# Patient Record
Sex: MALE | Race: WHITE | Employment: OTHER | URBAN - METROPOLITAN AREA
[De-identification: names, ages, dates, MRNs, and addresses within clinical notes are randomized per-mention and may not be internally consistent; named-entity substitution may affect disease eponyms.]

---

## 2017-11-09 ENCOUNTER — OFFICE VISIT (OUTPATIENT)
Dept: INTERNAL MEDICINE CLINIC | Age: 62
End: 2017-11-09

## 2017-11-09 VITALS
TEMPERATURE: 97.2 F | HEIGHT: 71 IN | RESPIRATION RATE: 14 BRPM | HEART RATE: 72 BPM | OXYGEN SATURATION: 96 % | BODY MASS INDEX: 25.2 KG/M2 | SYSTOLIC BLOOD PRESSURE: 132 MMHG | WEIGHT: 180 LBS | DIASTOLIC BLOOD PRESSURE: 84 MMHG

## 2017-11-09 DIAGNOSIS — Z00.00 ROUTINE GENERAL MEDICAL EXAMINATION AT A HEALTH CARE FACILITY: Primary | ICD-10-CM

## 2017-11-09 DIAGNOSIS — Z23 ENCOUNTER FOR IMMUNIZATION: ICD-10-CM

## 2017-11-09 DIAGNOSIS — H91.93 DECREASED HEARING OF BOTH EARS: ICD-10-CM

## 2017-11-09 RX ORDER — 1.1% SODIUM FLUORIDE PRESCRIPTION DENTAL CREAM 5 MG/G
CREAM DENTAL
Refills: 1 | COMMUNITY
Start: 2017-10-31 | End: 2018-11-20

## 2017-11-09 NOTE — PROGRESS NOTES
Chief Complaint   Patient presents with    Establish Care     Pt is here to establish care    Complete Physical     1. Have you been to the ER, urgent care clinic since your last visit? Hospitalized since your last visit? No    2. Have you seen or consulted any other health care providers outside of the [facility] since your last visit? Include any pap smears or colon screening.  No

## 2017-11-09 NOTE — PATIENT INSTRUCTIONS
are available. There is no live flu virus in flu shots. They cannot cause the flu. There are many flu viruses, and they are always changing. Each year a new flu vaccine is made to protect against three or four viruses that are likely to cause disease in the upcoming flu season. But even when the vaccine doesnt exactly match these viruses, it may still provide some protection    Flu vaccine cannot prevent:   flu that is caused by a virus not covered by the vaccine, or   illnesses that look like flu but are not. It takes about 2 weeks for protection to develop after vaccination, and protection lasts through the flu season. 3. Some people should not get this vaccine    Tell the person who is giving you the vaccine:     If you have any severe, life-threatening allergies. If you ever had a life-threatening allergic reaction after a dose of flu vaccine, or have a severe allergy to any part of this vaccine, you may be advised not to get vaccinated. Most, but not all, types of flu vaccine contain a small amount of egg protein.  If you ever had Guillain-Barré Syndrome (also called GBS). Some people with a history of GBS should not get this vaccine. This should be discussed with your doctor.  If you are not feeling well. It is usually okay to get flu vaccine when you have a mild illness, but you might be asked to come back when you feel better. 4. Risks of a vaccine reaction    With any medicine, including vaccines, there is a chance of reactions. These are usually mild and go away on their own, but serious reactions are also possible. Most people who get a flu shot do not have any problems with it.      Minor problems following a flu shot include:    soreness, redness, or swelling where the shot was given     hoarseness   sore, red or itchy eyes   cough   fever   aches   headache   itching   fatigue  If these problems occur, they usually begin soon after the shot and last 1 or 2 days. More serious problems following a flu shot can include the following:     There may be a small increased risk of Guillain-Barré Syndrome (GBS) after inactivated flu vaccine. This risk has been estimated at 1 or 2 additional cases per million people vaccinated. This is much lower than the risk of severe complications from flu, which can be prevented by flu vaccine.  Young children who get the flu shot along with pneumococcal vaccine (PCV13) and/or DTaP vaccine at the same time might be slightly more likely to have a seizure caused by fever. Ask your doctor for more information. Tell your doctor if a child who is getting flu vaccine has ever had a seizure. Problems that could happen after any injected vaccine:      People sometimes faint after a medical procedure, including vaccination. Sitting or lying down for about 15 minutes can help prevent fainting, and injuries caused by a fall. Tell your doctor if you feel dizzy, or have vision changes or ringing in the ears.  Some people get severe pain in the shoulder and have difficulty moving the arm where a shot was given. This happens very rarely.  Any medication can cause a severe allergic reaction. Such reactions from a vaccine are very rare, estimated at about 1 in a million doses, and would happen within a few minutes to a few hours after the vaccination. As with any medicine, there is a very remote chance of a vaccine causing a serious injury or death. The safety of vaccines is always being monitored. For more information, visit: www.cdc.gov/vaccinesafety/    5. What if there is a serious reaction? What should I look for?  Look for anything that concerns you, such as signs of a severe allergic reaction, very high fever, or unusual behavior.     Signs of a severe allergic reaction can include hives, swelling of the face and throat, difficulty breathing, a fast heartbeat, dizziness, and weakness - usually within a few minutes to a few hours after the vaccination. What should I do?  If you think it is a severe allergic reaction or other emergency that cant wait, call 9-1-1 and get the person to the nearest hospital. Otherwise, call your doctor.  Reactions should be reported to the Vaccine Adverse Event Reporting System (VAERS). Your doctor should file this report, or you can do it yourself through  the VAERS web site at www.vaers. [facility].gov, or by calling 0-186.620.4289. VAERS does not give medical advice. 6. The National Vaccine Injury Compensation Program    The [facility] Vaccine Injury Compensation Program (VICP) is a federal program that was created to compensate people who may have been injured by certain vaccines. Persons who believe they may have been injured by a vaccine can learn about the program and about filing a claim by calling 3-346.111.8389 or visiting the [employer] website at www.[facility].gov/vaccinecompensation. There is a time limit to file a claim for compensation. 7. How can I learn more?  Ask your healthcare provider. He or she can give you the vaccine package insert or suggest other sources of information.  Call your local or state health department.  Contact the Centers for Disease Control and Prevention (CDC):  - Call 1-266.224.8220 (1-800-CDC-INFO) or  - Visit CDCs website at www.cdc.gov/flu    Vaccine Information Statement   Inactivated Influenza Vaccine   8/7/2015  42 GUNJAN Lalo Timothy [id number]    Department of Health and Human Services  Centers for Disease Control and Prevention    Office Use Only

## 2017-11-09 NOTE — PROGRESS NOTES
Establish Care (Pt is here to establish care) and Complete Physical       HPI:  Ct Mondragon is a 60y.o. year old male who is here to establish care. He  had his medical care:    Dr. Clarke Espinosa in [place]. He reports the following history and medical concerns:      /80 at screening. Exercises regularly and diet is good. Tracks food,  Gets 50 miles a week. Son lifts weight with him    Brother x 2-  61 had MI. He changed his lifestyle after that. Used to weight 230 lbs. Had high cholesterol and improved with diet. Had colonoscopy 3 years ago. No polyps. Hearing issue- women's voices. 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 Assessment and Plan        1. Routine general medical examination at a health care facility  Well exam.  Pt exercises and eats healthily. No chest pain or SOB with exertion. Recheck labs   - CBC WITH AUTOMATED DIFF  - LIPID PANEL  - TSH REFLEX TO T4  - METABOLIC PANEL, COMPREHENSIVE  - PSA SCREENING (SCREENING)  - UA/M W/RFLX CULTURE, ROUTINE  - MICROALBUMIN, UR, RAND W/ MICROALBUMIN/CREA RATIO  - CBC WITH AUTOMATED DIFF; Future  - METABOLIC PANEL, COMPREHENSIVE; Future  - LIPID PANEL; Future  - PSA TOTAL (REFLEX TO FREE); Future  - URINALYSIS W/ RFLX MICROSCOPIC; Future  - TSH 3RD GENERATION; Future  - VITAMIN D, 25 HYDROXY; Future    2. Encounter for immunization  Immunization given. Discussed risks and benefits. Side effects. VIS given through visit summary via [employer] or paper copy if not on Mychart   - Influenza virus vaccine (QUADRIVALENT PRES FREE SYRINGE) IM (58959)  - AK IMMUNIZ ADMIN,1 SINGLE/COMB VAC/TOXOID  - diph,Pertuss,Acell,,Tet Vac-PF (ADACEL) 2 Lf-(2.5-5-3-5 mcg)-5Lf/0.5 mL susp; 0.5 mL by IntraMUSCular route once for 1 dose. Dispense: 0.5 mL; Refill: 0    3.  Decreased hearing of both ears  Audiology evaluation referral.  - REFERRAL TO AUDIOLOGY        Visit Vitals    /90 (BP 1 Location: Left arm, BP Patient Position: Sitting)    Pulse 72    Temp 97.2 °F (36.2 °C) (Oral)    Resp 14    Ht 5' 10.5\" (1.791 m)    Wt 180 lb (81.6 kg)    SpO2 96%    BMI 25.46 kg/m2       Historical Data    Past Medical History:   Diagnosis Date    GERD (gastroesophageal reflux disease)     Hypercholesterolemia     Trauma mid 1990's    closed head trauma; no seizures/residua       Past Surgical History:   Procedure Laterality Date    ENDOSCOPY, COLON, DIAGNOSTIC  4/4/2004    Dr. Luis James       Outpatient Encounter Prescriptions as of 11/9/2017   Medication Sig Dispense Refill    SF 5000 PLUS 1.1 % crea   1    multivitamin (ONE A DAY) tablet Take 1 Tab by mouth daily.  ascorbic acid (VITAMIN C) 500 mg tablet Take 500 mg by mouth daily. No facility-administered encounter medications on file as of 11/9/2017. No Known Allergies     Social History     Social History    Marital status:      Spouse name: N/A    Number of children: N/A    Years of education: N/A     Occupational History    Not on file. Social History Main Topics    Smoking status: Former Smoker     Packs/day: 0.10     Years: 1.00     Types: Cigarettes    Smokeless tobacco: Never Used      Comment: stopped 36 yrs ago; just experimented    Alcohol use Yes      Comment: socially    Drug use: No    Sexual activity: Yes     Partners: Female     Birth control/ protection: Surgical     Other Topics Concern    Not on file     Social History Narrative        family history includes Alcohol abuse in his father; Diabetes in his maternal grandmother; Heart Disease in his brother; Hypertension in his brother and brother. Review of Systems   Constitutional: Negative for chills, diaphoresis, fever, malaise/fatigue and weight loss. HENT: Negative for hearing loss. Respiratory: Negative for cough. Cardiovascular: Negative for chest pain. Gastrointestinal: Negative for blood in stool and constipation.    Genitourinary: Negative for dysuria, flank pain, frequency and urgency. Musculoskeletal: Negative for myalgias. Skin: Negative for rash. Neurological: Negative for dizziness, weakness and headaches. Endo/Heme/Allergies: Does not bruise/bleed easily. Physical Exam   Constitutional: He is oriented to person, place, and time. He appears well-nourished. No distress. Neck: Carotid bruit is not present. No thyromegaly present. Cardiovascular: Normal rate, regular rhythm and normal heart sounds. Pulmonary/Chest: Effort normal and breath sounds normal. No respiratory distress. He has no wheezes. Abdominal: Soft. Bowel sounds are normal. He exhibits no mass. There is no tenderness. Hernia confirmed negative in the right inguinal area and confirmed negative in the left inguinal area. Genitourinary: Rectum normal, prostate normal and penis normal. Rectal exam shows guaiac negative stool. Right testis shows no mass, no swelling and no tenderness. Left testis shows no mass, no swelling and no tenderness. No penile tenderness. Musculoskeletal: He exhibits no edema or tenderness. Lymphadenopathy:     He has no cervical adenopathy. No inguinal adenopathy noted on the right or left side. Right: No inguinal adenopathy present. Left: No inguinal adenopathy present. Neurological: He is alert and oriented to person, place, and time. Skin: Skin is warm and dry. No rash noted. No erythema. Psychiatric: He has a normal mood and affect. Thought content normal.   Nursing note and vitals reviewed. Ortho Exam       Orders Placed This Encounter    [place] IMMUNIZ ADMIN,1 SINGLE/COMB VAC/TOXOID    Influenza virus vaccine (QUADRIVALENT PRES FREE SYRINGE) IM ([id number])    SF 5000 PLUS 1.1 % crea     Refill:  1        I have reviewed the patient's medical history in detail and updated the computerized patient record. We had a prolonged discussion about these complex clinical issues and went over the various important aspects to consider.  All questions were answered. Advised him to call back or return to office if symptoms do not improve, change in nature, or persist.    He was given an after visit summary or informed of [employer] Access which includes patient instructions, diagnoses, current medications, & vitals. He expressed understanding with the diagnosis and plan.

## 2017-11-09 NOTE — MR AVS SNAPSHOT
Visit Information Date & Time Provider Department Dept. Phone Encounter #  
 11/9/2017  9:15 AM Julienne Mcmillan MD [place] [id number] [facility] [phone] [id number] Follow-up Instructions Return in about 1 year (around 11/9/2018) for CPE, 30 min slot. Upcoming Health Maintenance Date Due Hepatitis C Screening 1955 DTaP/Tdap/Td series (1 - Tdap) 5/13/1975 Influenza Age 5 to Adult 8/1/2017 COLONOSCOPY 10/15/2025 Allergies as of 11/9/2017  Review Complete On: 11/9/2017 By: Julienne Mcmillan MD  
 No Known Allergies Current Immunizations  Reviewed on 1/31/2011 Name Date Influenza Vaccine (Quad) PF 11/9/2017 Influenza Vaccine Split 11/1/2010 Not reviewed this visit You Were Diagnosed With   
  
 Codes Comments Routine general medical examination at a health care facility    -  Primary ICD-10-CM: Z00.00 ICD-9-CM: V70.0 Encounter for immunization     ICD-10-CM: D64 ICD-9-CM: V03.89 Decreased hearing of both ears     ICD-10-CM: H91.93 
ICD-9-CM: 389. 9 Vitals BP Pulse Temp Resp Height(growth percentile) Weight(growth percentile) 142/90 (BP 1 Location: Left arm, BP Patient Position: Sitting) 72 97.2 °F (36.2 °C) (Oral) 14 5' 10.5\" (1.791 m) 180 lb (81.6 kg) SpO2 BMI Smoking Status 96% 25.46 kg/m2 Never Smoker BMI and BSA Data Body Mass Index Body Surface Area  
 25.46 kg/m 2 2.01 m 2 Preferred Pharmacy Pharmacy Name Phone [phone] [address] [phone] Your Updated Medication List  
  
   
This list is accurate as of: 11/9/17  9:52 AM.  Always use your most recent med list.  
  
  
  
  
 diph,Pertuss(Acell),Tet Vac-PF 2 Lf-(2.5-5-3-5 mcg)-5Lf/0.5 mL susp Commonly known as:  ADACEL  
0.5 mL by IntraMUSCular route once for 1 dose. multivitamin tablet Commonly known as:  ONE A DAY  
 Take 1 Tab by mouth daily. SF 5000 PLUS 1.1 % Crea Generic drug:  fluoride (sodium) VITAMIN C 500 mg tablet Generic drug:  ascorbic acid (vitamin C) Take 500 mg by mouth daily. Prescriptions Printed Refills diph,Pertuss,Acell,,Tet Vac-PF (ADACEL) 2 Lf-(2.5-5-3-5 mcg)-5Lf/0.5 mL susp 0 Si.5 mL by IntraMUSCular route once for 1 dose. Class: Print Route: IntraMUSCular We Performed the Following CBC WITH AUTOMATED DIFF [83210 CPT(R)] INFLUENZA VIRUS VAC QUAD,SPLIT,PRESV FREE SYRINGE IM [id number] CPT(R)] LIPID PANEL [58278 CPT(R)] METABOLIC PANEL, COMPREHENSIVE [62495 CPT(R)] MICROALBUMIN, UR, RAND W/ MICROALBUMIN/CREA RATIO [id number] CPT(R)] [place] IMMUNIZ ADMIN,1 SINGLE/COMB VAC/TOXOID [id number] CPT(R)] PSA SCREENING (SCREENING) [ [facility]] TSH REFLEX TO T4 [[id number] Custom] UA/M W/RFLX CULTURE, ROUTINE [[id number] Custom] Follow-up Instructions Return in about 1 year (around 2018) for CPE, 30 min slot. Patient Instructions Vaccine Information Statement Influenza (Flu) Vaccine (Inactivated or Recombinant): What you need to know Many Vaccine Information Statements are available in Welsh and other languages. See www.immunize.org/vis Hojas de Información Sobre Vacunas están disponibles en Español y en muchos otros idiomas. Visite www.immunize.org/vis 1. Why get vaccinated? Influenza (flu) is a contagious disease that spreads around the [place] every year, usually between October and May. Flu is caused by influenza viruses, and is spread mainly by coughing, sneezing, and close contact. Anyone can get flu. Flu strikes suddenly and can last several days. Symptoms vary by age, but can include: 
 fever/chills  sore throat  muscle aches  fatigue  cough  headache  runny or stuffy nose Flu can also lead to pneumonia and blood infections, and cause diarrhea and seizures in children. If you have a medical condition, such as heart or lung disease, flu can make it worse. Flu is more dangerous for some people. Infants and young children, people 72years of age and older, pregnant women, and people with certain health conditions or a weakened immune system are at greatest risk. Each year thousands of people in the [facility] die from flu, and many more are hospitalized. Flu vaccine can: 
 keep you from getting flu, 
 make flu less severe if you do get it, and 
 keep you from spreading flu to your family and other people. 2. Inactivated and recombinant flu vaccines A dose of flu vaccine is recommended every flu season. Children 6 months through 6years of age may need two doses during the same flu season. Everyone else needs only one dose each flu season. Some inactivated flu vaccines contain a very small amount of a mercury-based preservative called thimerosal. Studies have not shown thimerosal in vaccines to be harmful, but flu vaccines that do not contain thimerosal are available. There is no live flu virus in flu shots. They cannot cause the flu. There are many flu viruses, and they are always changing. Each year a new flu vaccine is made to protect against three or four viruses that are likely to cause disease in the upcoming flu season. But even when the vaccine doesnt exactly match these viruses, it may still provide some protection Flu vaccine cannot prevent: 
 flu that is caused by a virus not covered by the vaccine, or 
 illnesses that look like flu but are not. It takes about 2 weeks for protection to develop after vaccination, and protection lasts through the flu season. 3. Some people should not get this vaccine Tell the person who is giving you the vaccine:  If you have any severe, life-threatening allergies.    
If you ever had a life-threatening allergic reaction after a dose of flu vaccine, or have a severe allergy to any part of this vaccine, you may be advised not to get vaccinated. Most, but not all, types of flu vaccine contain a small amount of egg protein.  If you ever had Guillain-Barré Syndrome (also called GBS). Some people with a history of GBS should not get this vaccine. This should be discussed with your doctor.  If you are not feeling well. It is usually okay to get flu vaccine when you have a mild illness, but you might be asked to come back when you feel better. 4. Risks of a vaccine reaction With any medicine, including vaccines, there is a chance of reactions. These are usually mild and go away on their own, but serious reactions are also possible. Most people who get a flu shot do not have any problems with it. Minor problems following a flu shot include:  
 soreness, redness, or swelling where the shot was given  hoarseness  sore, red or itchy eyes  cough  fever  aches  headache  itching  fatigue If these problems occur, they usually begin soon after the shot and last 1 or 2 days. More serious problems following a flu shot can include the following:  There may be a small increased risk of Guillain-Barré Syndrome (GBS) after inactivated flu vaccine. This risk has been estimated at 1 or 2 additional cases per million people vaccinated. This is much lower than the risk of severe complications from flu, which can be prevented by flu vaccine.  Young children who get the flu shot along with pneumococcal vaccine (PCV13) and/or DTaP vaccine at the same time might be slightly more likely to have a seizure caused by fever. Ask your doctor for more information. Tell your doctor if a child who is getting flu vaccine has ever had a seizure. Problems that could happen after any injected vaccine:  People sometimes faint after a medical procedure, including vaccination. Sitting or lying down for about 15 minutes can help prevent fainting, and injuries caused by a fall. Tell your doctor if you feel dizzy, or have vision changes or ringing in the ears.  Some people get severe pain in the shoulder and have difficulty moving the arm where a shot was given. This happens very rarely.  Any medication can cause a severe allergic reaction. Such reactions from a vaccine are very rare, estimated at about 1 in a million doses, and would happen within a few minutes to a few hours after the vaccination. As with any medicine, there is a very remote chance of a vaccine causing a serious injury or death. The safety of vaccines is always being monitored. For more information, visit: www.cdc.gov/vaccinesafety/ 
 
 
The [facility] Vaccine Injury Compensation Program (VICP) is a federal program that was created to compensate people who may have been injured by certain vaccines.  
 
Persons who believe they may have been injured by a vaccine can learn about the program and about filing a claim by calling 9-498.997.3269 or visiting the [employer] website at [facility].gov/vaccinecompensation. There is a time limit to file a claim for compensation. 7. How can I learn more?  Ask your healthcare provider. He or she can give you the vaccine package insert or suggest other sources of information.  Call your local or state health department.  Contact the Centers for Disease Control and Prevention (CDC): 
- Call 6-444.767.9231 (1-800-CDC-INFO) or 
- Visit CDCs website at www.cdc.gov/flu Vaccine Information Statement Inactivated Influenza Vaccine 8/7/2015 
42 GUNJAN Lynn [id number] [facility] [place] Centers for Disease Control and Prevention Office Use Only Introducing [facility]! [facility] introduces [employer] patient portal. Now you can access parts of your medical record, email your doctor's office, and request medication refills online. 1. In your internet browser, go to [email or web address] 2. Click on the First Time User? Click Here link in the Sign In box. You will see the New Member Sign Up page. 3. Enter your [employer] Access Code exactly as it appears below. You will not need to use this code after youve completed the sign-up process. If you do not sign up before the expiration date, you must request a new code. · [employer] Access Code: [email or web address] Expires: 2/7/2018  9:21 AM 
 
4. Enter the last four digits of your Social Security Number (xxxx) and Date of Birth (mm/dd/yyyy) as indicated and click Submit. You will be taken to the next sign-up page. 5. Create a [employer] ID. This will be your [employer] login ID and cannot be changed, so think of one that is secure and easy to remember. 6. Create a [employer] password. You can change your password at any time. 7. Enter your Password Reset Question and Answer. This can be used at a later time if you forget your password. 8. Enter your e-mail address. You will receive e-mail notification when new information is available in [address] 9. Click Sign Up. You can now view and download portions of your medical record. 10. Click the Download Summary menu link to download a portable copy of your medical information. If you have questions, please visit the Frequently Asked Questions section of the [employer] website. Remember, [employer] is NOT to be used for urgent needs. For medical emergencies, dial 911. Now available from your iPhone and Android! Please provide this summary of care documentation to your next provider. Your primary care clinician is listed as [facility]. If you have any questions after today's visit, please call [phone].

## 2017-11-11 LAB
ALBUMIN SERPL-MCNC: 4.6 G/DL (ref 3.6–4.8)
ALBUMIN/CREAT UR: 2.7 MG/G CREAT (ref 0–30)
ALBUMIN/GLOB SERPL: 2.1 {RATIO} (ref 1.2–2.2)
ALP SERPL-CCNC: 49 IU/L (ref 39–117)
ALT SERPL-CCNC: 15 IU/L (ref 0–44)
APPEARANCE UR: CLEAR
AST SERPL-CCNC: 18 IU/L (ref 0–40)
BACTERIA #/AREA URNS HPF: NORMAL /[HPF]
BASOPHILS # BLD AUTO: 0.1 X10E3/UL (ref 0–0.2)
BASOPHILS NFR BLD AUTO: 1 %
BILIRUB SERPL-MCNC: 0.5 MG/DL (ref 0–1.2)
BILIRUB UR QL STRIP: NEGATIVE
BUN SERPL-MCNC: 18 MG/DL (ref 8–27)
BUN/CREAT SERPL: 17 (ref 10–24)
CALCIUM SERPL-MCNC: 9 MG/DL (ref 8.6–10.2)
CASTS URNS QL MICRO: NORMAL /LPF
CHLORIDE SERPL-SCNC: 102 MMOL/L (ref 96–106)
CHOLEST SERPL-MCNC: 193 MG/DL (ref 100–199)
CO2 SERPL-SCNC: 26 MMOL/L (ref 18–29)
COLOR UR: YELLOW
CREAT SERPL-MCNC: 1.04 MG/DL (ref 0.76–1.27)
CREAT UR-MCNC: 115.1 MG/DL
EOSINOPHIL # BLD AUTO: 0.3 X10E3/UL (ref 0–0.4)
EOSINOPHIL NFR BLD AUTO: 4 %
EPI CELLS #/AREA URNS HPF: NORMAL /HPF
ERYTHROCYTE [DISTWIDTH] IN BLOOD BY AUTOMATED COUNT: 13.1 % (ref 12.3–15.4)
GFR SERPLBLD CREATININE-BSD FMLA CKD-EPI: 76 ML/MIN/1.73
GFR SERPLBLD CREATININE-BSD FMLA CKD-EPI: 88 ML/MIN/1.73
GLOBULIN SER CALC-MCNC: 2.2 G/DL (ref 1.5–4.5)
GLUCOSE SERPL-MCNC: 87 MG/DL (ref 65–99)
GLUCOSE UR QL: NEGATIVE
HCT VFR BLD AUTO: 46.9 % (ref 37.5–51)
HDLC SERPL-MCNC: 63 MG/DL
HGB BLD-MCNC: 16 G/DL (ref 12.6–17.7)
HGB UR QL STRIP: NEGATIVE
IMM GRANULOCYTES # BLD: 0 X10E3/UL (ref 0–0.1)
IMM GRANULOCYTES NFR BLD: 0 %
INTERPRETATION, 910389: NORMAL
KETONES UR QL STRIP: NEGATIVE
LDLC SERPL CALC-MCNC: 112 MG/DL (ref 0–99)
LEUKOCYTE ESTERASE UR QL STRIP: NEGATIVE
LYMPHOCYTES # BLD AUTO: 2.5 X10E3/UL (ref 0.7–3.1)
LYMPHOCYTES NFR BLD AUTO: 33 %
MCH RBC QN AUTO: 29.7 PG (ref 26.6–33)
MCHC RBC AUTO-ENTMCNC: 34.1 G/DL (ref 31.5–35.7)
MCV RBC AUTO: 87 FL (ref 79–97)
MICRO URNS: NORMAL
MICRO URNS: NORMAL
MICROALBUMIN UR-MCNC: 3.1 UG/ML
MONOCYTES # BLD AUTO: 0.7 X10E3/UL (ref 0.1–0.9)
MONOCYTES NFR BLD AUTO: 9 %
MUCOUS THREADS URNS QL MICRO: PRESENT
NEUTROPHILS # BLD AUTO: 4.1 X10E3/UL (ref 1.4–7)
NEUTROPHILS NFR BLD AUTO: 53 %
NITRITE UR QL STRIP: NEGATIVE
PH UR STRIP: 6 [PH] (ref 5–7.5)
PLATELET # BLD AUTO: 258 X10E3/UL (ref 150–379)
POTASSIUM SERPL-SCNC: 4.4 MMOL/L (ref 3.5–5.2)
PROT SERPL-MCNC: 6.8 G/DL (ref 6–8.5)
PROT UR QL STRIP: NEGATIVE
PSA SERPL-MCNC: 0.6 NG/ML (ref 0–4)
RBC # BLD AUTO: 5.38 X10E6/UL (ref 4.14–5.8)
RBC #/AREA URNS HPF: NORMAL /HPF
SODIUM SERPL-SCNC: 142 MMOL/L (ref 134–144)
SP GR UR: 1.02 (ref 1–1.03)
TRIGL SERPL-MCNC: 90 MG/DL (ref 0–149)
TSH SERPL DL<=0.005 MIU/L-ACNC: 2.27 UIU/ML (ref 0.45–4.5)
URINALYSIS REFLEX, 377202: NORMAL
UROBILINOGEN UR STRIP-MCNC: 0.2 MG/DL (ref 0.2–1)
VLDLC SERPL CALC-MCNC: 18 MG/DL (ref 5–40)
WBC # BLD AUTO: 7.7 X10E3/UL (ref 3.4–10.8)
WBC #/AREA URNS HPF: NORMAL /HPF

## 2018-11-20 ENCOUNTER — OFFICE VISIT (OUTPATIENT)
Dept: INTERNAL MEDICINE CLINIC | Age: 63
End: 2018-11-20

## 2018-11-20 ENCOUNTER — TELEPHONE (OUTPATIENT)
Dept: INTERNAL MEDICINE CLINIC | Age: 63
End: 2018-11-20

## 2018-11-20 VITALS
RESPIRATION RATE: 18 BRPM | BODY MASS INDEX: 25.73 KG/M2 | WEIGHT: 190 LBS | HEIGHT: 72 IN | OXYGEN SATURATION: 96 % | HEART RATE: 66 BPM | DIASTOLIC BLOOD PRESSURE: 80 MMHG | TEMPERATURE: 97.1 F | SYSTOLIC BLOOD PRESSURE: 136 MMHG

## 2018-11-20 DIAGNOSIS — Z00.00 ROUTINE GENERAL MEDICAL EXAMINATION AT A HEALTH CARE FACILITY: Primary | ICD-10-CM

## 2018-11-20 DIAGNOSIS — E78.00 PURE HYPERCHOLESTEROLEMIA: ICD-10-CM

## 2018-11-20 NOTE — PATIENT INSTRUCTIONS
Vitamin D3 2000 International units once a day Appointment on 11/15/2018 Component Date Value Ref Range Status  VITAMIN D, 25-HYDROXY 11/15/2018 16.3* 30.0 - 100.0 ng/mL Final  
 Comment: Vitamin D deficiency has been defined by the [address] [facility] practice guideline as a 
level of serum 25-OH vitamin D less than 20 ng/mL (1,2). The Endocrine Society went on to further define vitamin D 
insufficiency as a level between 21 and 29 ng/mL (2). 1. IOM ([place] of [employer]). 2010. Dietary reference 
   intakes for calcium and D. [facility]: The 
   [place]. 2. Sunitha MF, Agustina NC, Zeny PRADHAN, et al. 
   Evaluation, treatment, and prevention of vitamin D 
   deficiency: an Endocrine Society clinical practice 
   guideline. JCEM. 2011 Jul; 96(7):1911-30.  TSH 11/15/2018 1.360  0.450 - 4.500 uIU/mL Final  
 Specific Gravity 11/15/2018 1.023  1.005 - 1.030 Final  
 pH (UA) 11/15/2018 5.5  5.0 - 7.5 Final  
 Color 11/15/2018 Yellow  Yellow Final  
 Appearance 11/15/2018 Clear  Clear Final  
 Leukocyte Esterase 11/15/2018 Negative  Negative Final  
 Protein 11/15/2018 Negative  Negative/Trace Final  
 Glucose 11/15/2018 Negative  Negative Final  
 Ketone 11/15/2018 Negative  Negative Final  
 Blood 11/15/2018 Negative  Negative Final  
 Bilirubin 11/15/2018 Negative  Negative Final  
 Urobilinogen 11/15/2018 0.2  0.2 - 1.0 mg/dL Final  
 Nitrites 11/15/2018 Negative  Negative Final  
 Microscopic Examination 11/15/2018 Comment   Final  
 Microscopic not indicated and not performed.  Prostate Specific Ag 11/15/2018 0.8  0.0 - 4.0 ng/mL Final  
 Comment: Roche ECLIA methodology. According to the American Urological Association, Serum PSA should 
decrease and remain at undetectable levels after radical 
prostatectomy.  The AUA defines biochemical recurrence as an initial 
 PSA value 0.2 ng/mL or greater followed by a subsequent confirmatory PSA value 0.2 ng/mL or greater. Values obtained with different assay methods or kits cannot be used 
interchangeably. Results cannot be interpreted as absolute evidence 
of the presence or absence of malignant disease.  Reflex Criteria 11/15/2018 Comment   Final  
 Comment: The percent free PSA is performed on a reflex basis only when the 
total PSA is between 4.0 and 10.0 ng/mL.  Cholesterol, total 11/15/2018 223* 100 - 199 mg/dL Final  
 Triglyceride 11/15/2018 119  0 - 149 mg/dL Final  
 HDL Cholesterol 11/15/2018 67  >39 mg/dL Final  
 VLDL, calculated 11/15/2018 24  5 - 40 mg/dL Final  
 LDL, calculated 11/15/2018 132* 0 - 99 mg/dL Final  
 Glucose 11/15/2018 94  65 - 99 mg/dL Final  
 BUN 11/15/2018 20  8 - 27 mg/dL Final  
 Creatinine 11/15/2018 1.13  0.76 - 1.27 mg/dL Final  
 GFR est non-AA 11/15/2018 68  >59 mL/min/1.73 Final  
 GFR est AA 11/15/2018 79  >59 mL/min/1.73 Final  
 BUN/Creatinine ratio 11/15/2018 18  10 - 24 Final  
 Sodium 11/15/2018 143  134 - 144 mmol/L Final  
 Potassium 11/15/2018 4.4  3.5 - 5.2 mmol/L Final  
 Chloride 11/15/2018 103  96 - 106 mmol/L Final  
 CO2 11/15/2018 21  20 - 29 mmol/L Final  
 Calcium 11/15/2018 9.0  8.6 - 10.2 mg/dL Final  
 Protein, total 11/15/2018 6.9  6.0 - 8.5 g/dL Final  
 Albumin 11/15/2018 4.6  3.6 - 4.8 g/dL Final  
 GLOBULIN, TOTAL 11/15/2018 2.3  1.5 - 4.5 g/dL Final  
 A-G Ratio 11/15/2018 2.0  1.2 - 2.2 Final  
 Bilirubin, total 11/15/2018 0.5  0.0 - 1.2 mg/dL Final  
 Alk.  phosphatase 11/15/2018 55  39 - 117 IU/L Final  
 AST (SGOT) 11/15/2018 20  0 - 40 IU/L Final  
 ALT (SGPT) 11/15/2018 19  0 - 44 IU/L Final  
 WBC 11/15/2018 6.5  3.4 - 10.8 x10E3/uL Final  
 RBC 11/15/2018 5.12  4.14 - 5.80 x10E6/uL Final  
 HGB 11/15/2018 15.0  13.0 - 17.7 g/dL Final  
 HCT 11/15/2018 45.4  37.5 - 51.0 % Final  
 MCV 11/15/2018 89  79 - 97 fL Final  
  MCH 11/15/2018 29.3  26.6 - 33.0 pg Final  
 MCHC 11/15/2018 33.0  31.5 - 35.7 g/dL Final  
 RDW 11/15/2018 14.8  12.3 - 15.4 % Final  
 PLATELET 68/14/1977 591  150 - 379 x10E3/uL Final  
 NEUTROPHILS 11/15/2018 64  Not Estab. % Final  
 Lymphocytes 11/15/2018 24  Not Estab. % Final  
 MONOCYTES 11/15/2018 7  Not Estab. % Final  
 EOSINOPHILS 11/15/2018 4  Not Estab. % Final  
 BASOPHILS 11/15/2018 1  Not Estab. % Final  
 ABS. NEUTROPHILS 11/15/2018 4.2  1.4 - 7.0 x10E3/uL Final  
 Abs Lymphocytes 11/15/2018 1.5  0.7 - 3.1 x10E3/uL Final  
 ABS. MONOCYTES 11/15/2018 0.5  0.1 - 0.9 x10E3/uL Final  
 ABS. EOSINOPHILS 11/15/2018 0.2  0.0 - 0.4 x10E3/uL Final  
 ABS. BASOPHILS 11/15/2018 0.0  0.0 - 0.2 x10E3/uL Final  
 IMMATURE GRANULOCYTES 11/15/2018 0  Not Estab. % Final  
 ABS. IMM. GRANS. 11/15/2018 0.0  0.0 - 0.1 x10E3/uL Final  
 INTERPRETATION 11/15/2018 Note   Final  
 Supplemental report is available.

## 2018-11-20 NOTE — PROGRESS NOTES
HPI: 
Thea De La Rosa is a 58y.o. year old male who is here for an annual physical: 
LAST SEEN: 11/15/2018 Wt Readings from Last 3 Encounters:  
11/20/18 190 lb (86.2 kg) 11/09/17 180 lb (81.6 kg) 01/24/12 180 lb (81.6 kg) Temp Readings from Last 3 Encounters:  
11/20/18 97.1 °F (36.2 °C) (Oral) 11/09/17 97.2 °F (36.2 °C) (Oral) 01/24/12 98.7 °F (37.1 °C) BP Readings from Last 3 Encounters:  
11/20/18 142/90  
11/09/17 132/84  
01/24/12 137/76 Pulse Readings from Last 3 Encounters:  
11/20/18 66  
11/09/17 72  
01/24/12 81 He reports the following history and medical concerns: Body mass index is 25.77 kg/m². Lab Results Component Value Date/Time Cholesterol, total 223 (H) 11/15/2018 08:05 AM  
 HDL Cholesterol 67 11/15/2018 08:05 AM  
 LDL, calculated 132 (H) 11/15/2018 08:05 AM  
 VLDL, calculated 24 11/15/2018 08:05 AM  
 Triglyceride 119 11/15/2018 08:05 AM  
  
Calorie count, exercised 5 miles a day. Swims. Lift weights. Had colonoscopy 5 years - never had polyps. . Assessment and Plan 1. Routine general medical examination at a health care facility Pt is very active. Exercises daily. Had colonoscopy 5 years ago as per pt. 2. Pure hypercholesterolemia Slight increase but overall good with HDL good. Discussed risks benefits of baby aspirin 81 mg. Pt will not need now. Historical Data Vitals:  
 11/20/18 0815 BP: 142/90 Pulse: 66 Resp: 18 Temp: 97.1 °F (36.2 °C) TempSrc: Oral  
SpO2: 96% Weight: 190 lb (86.2 kg) Height: 6' (1.829 m) Past Medical History:  
Diagnosis Date  GERD (gastroesophageal reflux disease)  Hypercholesterolemia  Trauma mid 1990's  
 closed head trauma; no seizures/residua Past Surgical History:  
Procedure Laterality Date  ENDOSCOPY, COLON, DIAGNOSTIC  4/4/2004 Dr. Radha Randle Outpatient Encounter Medications as of 11/20/2018 Medication Sig Dispense Refill  SF 5000 PLUS 1.1 % crea   1  
 multivitamin (ONE A DAY) tablet Take 1 Tab by mouth daily.  ascorbic acid (VITAMIN C) 500 mg tablet Take 500 mg by mouth daily. No facility-administered encounter medications on file as of 11/20/2018. No Known Allergies Social History Socioeconomic History  Marital status:  Spouse name: Not on file  Number of children: Not on file  Years of education: Not on file  Highest education level: Not on file Social Needs  Financial resource strain: Not on file  Food insecurity - worry: Not on file  Food insecurity - inability: Not on file  Transportation needs - medical: Not on file  Transportation needs - non-medical: Not on file Occupational History  Occupation: . Tobacco Use  Smoking status: Never Smoker  Smokeless tobacco: Never Used Substance and Sexual Activity  Alcohol use: Yes Comment: socially- couple of glasses each night  Drug use: No  
 Sexual activity: Yes  
  Partners: Female Birth control/protection: Surgical  
Other Topics Concern  Not on file Social History Narrative  Not on file  
  
 
family history includes Alcohol abuse in his father; Heart Attack (age of onset: 61) in his brother; Heart Disease (age of onset: 61) in his brother; Hypertension in his brother and brother. Review of Systems Constitutional: Negative for chills, diaphoresis, fever, malaise/fatigue and weight loss. HENT: Negative for hearing loss. Respiratory: Negative for cough. Cardiovascular: Negative for chest pain. Gastrointestinal: Negative for blood in stool and constipation. Genitourinary: Negative for dysuria, flank pain, frequency and urgency. Musculoskeletal: Negative for myalgias. Skin: Negative for rash. Neurological: Negative for dizziness, weakness and headaches. Endo/Heme/Allergies: Does not bruise/bleed easily. Visit Vitals /90 (BP 1 Location: Left arm, BP Patient Position: Sitting) Pulse 66 Temp 97.1 °F (36.2 °C) (Oral) Resp 18 Ht 6' (1.829 m) Wt 190 lb (86.2 kg) SpO2 96% BMI 25.77 kg/m² Physical Exam  
Constitutional: He is oriented to person, place, and time and well-developed, well-nourished, and in no distress. No distress. HENT:  
Nose: Nose normal.  
Mouth/Throat: Oropharynx is clear and moist. No oropharyngeal exudate. Eyes: Conjunctivae and EOM are normal. Left eye exhibits no discharge. Neck: Normal range of motion. Neck supple. No thyromegaly present. Cardiovascular: Normal rate, regular rhythm and normal heart sounds. Exam reveals no friction rub. No murmur heard. Pulmonary/Chest: Effort normal and breath sounds normal. No respiratory distress. He has no wheezes. He has no rales. Abdominal: Soft. Bowel sounds are normal. He exhibits no distension. There is no tenderness. Genitourinary: Rectum normal, prostate normal, testes/scrotum normal and penis normal. Rectal exam shows guaiac negative stool. He exhibits no abnormal testicular mass, no testicular tenderness, no abnormal scrotal mass, no scrotal tenderness and no epididymal tenderness. Penis exhibits no lesions and no edema. Musculoskeletal: Normal range of motion. He exhibits no edema, tenderness or deformity. Lymphadenopathy:  
  He has no cervical adenopathy. Right: No inguinal adenopathy present. Left: No inguinal adenopathy present. Neurological: He is alert and oriented to person, place, and time. He exhibits normal muscle tone. Coordination normal.  
Skin: Skin is warm and dry. No rash noted. No erythema. Psychiatric: Mood and affect normal.  
 
Ortho Exam  
 
Assessment:  See Above for discussion/plan. Annual Physical completed.  
 
Counseling and risk factor reduction topics were discussed such as continue with exercise regularly. Walk at fast pace. Watch fatty foods and cheese. I have reviewed the patient's medical history in detail and updated the computerized patient record. We had a prolonged discussion about these complex clinical issues and went over the various important aspects to consider. All questions were answered. Advised him to call back or return to office if symptoms do not improve, change in nature, or persist. 
 
He was given an after visit summary or informed of [facility] Access which includes patient instructions, diagnoses, current medications, & vitals. He expressed understanding with the diagnosis and plan.

## 2018-11-20 NOTE — PROGRESS NOTES
Reviewed record in preparation for visit and have obtained necessary documentation. Identified pt with two pt identifiers(name and ). Health Maintenance Due Topic  Hepatitis C Screening  DTaP/Tdap/Td series (1 - Tdap)  Shingrix Vaccine Age 50> (1 of 2)  Influenza Age 5 to Adult Chief Complaint Patient presents with  Complete Physical  
  
 
Wt Readings from Last 3 Encounters:  
18 190 lb (86.2 kg) 17 180 lb (81.6 kg) 12 180 lb (81.6 kg) Temp Readings from Last 3 Encounters:  
17 97.2 °F (36.2 °C) (Oral) 12 98.7 °F (37.1 °C) BP Readings from Last 3 Encounters:  
17 132/84  
12 137/76  
11 150/89 Pulse Readings from Last 3 Encounters:  
17 72  
12 81  
11 79 Learning Assessment: 
:  
 
Learning Assessment 2018 PRIMARY LEARNER Patient HIGHEST LEVEL OF EDUCATION - PRIMARY LEARNER  GRADUATED HIGH SCHOOL OR GED  
BARRIERS PRIMARY LEARNER NONE PRIMARY LANGUAGE ENGLISH  
LEARNER PREFERENCE PRIMARY DEMONSTRATION  
ANSWERED BY patient RELATIONSHIP SELF Depression Screening: 
:  
 
PHQ over the last two weeks 2018 Little interest or pleasure in doing things Not at all Feeling down, depressed, irritable, or hopeless Not at all Total Score PHQ 2 0 Fall Risk Assessment: 
:  
 
Fall Risk Assessment, last 12 mths 2018 Able to walk? Yes Fall in past 12 months? No  
 
 
Abuse Screening: 
:  
 
Abuse Screening Questionnaire 2018 Do you ever feel afraid of your partner? Jeri Zamora Are you in a relationship with someone who physically or mentally threatens you? Jeri Zamora Is it safe for you to go home? [place] Coordination of Care Questionnaire: 
:  
 
1) Have you been to an emergency room, urgent care clinic since your last visit? no  
Hospitalized since your last visit? no          
 
2) Have you seen or consulted any other health care providers outside of [facility] since your last visit? yes   Dr. Gipson Back 2017(Include any pap smears or colon screenings in this section.) 3) Do you have an Advance Directive on file? no 
 
4) Are you interested in receiving information on Advance Directives? YES Patient is accompanied by self I have received verbal consent from Nahomi Camara to discuss any/all medical information while they are present in the room.

## 2018-11-20 NOTE — TELEPHONE ENCOUNTER
----- Message from Alex Garza sent at 11/20/2018  1:35 PM EST -----  Regarding: [place]/telephone  Pts wife Iris Ayala is requesting her husbands physical form to be faxed to his insurance. Wifes number is [phone]

## 2019-07-17 ENCOUNTER — OFFICE VISIT (OUTPATIENT)
Dept: PRIMARY CARE CLINIC | Age: 64
End: 2019-07-17

## 2019-07-17 VITALS
BODY MASS INDEX: 26.54 KG/M2 | DIASTOLIC BLOOD PRESSURE: 92 MMHG | HEART RATE: 66 BPM | TEMPERATURE: 98.7 F | HEIGHT: 71 IN | SYSTOLIC BLOOD PRESSURE: 152 MMHG | OXYGEN SATURATION: 98 % | WEIGHT: 189.6 LBS | RESPIRATION RATE: 16 BRPM

## 2019-07-17 DIAGNOSIS — H91.93 BILATERAL HEARING LOSS, UNSPECIFIED HEARING LOSS TYPE: ICD-10-CM

## 2019-07-17 DIAGNOSIS — R03.0 WHITE COAT SYNDROME WITHOUT DIAGNOSIS OF HYPERTENSION: Primary | ICD-10-CM

## 2019-07-17 NOTE — PROGRESS NOTES
Chief Complaint   Patient presents with   [facility]     and patient needs referral for hearring aids     1. Have you been to an emergency room, urgent clinic, or hospitalized since your last visit? NO  If yes, where when, and reason for visit? 2. Have seen or consulted any other health care provider since your last visit? NO  Please include any pap smears or colon screening in this section  If yes, where when, and reason for visit? 3. Have you had any blood work or xray, including a mammogram since your last visit NO  If yes, where when, and reason for visit? 4. Are there any changes in medications including immunizations since your last visit? NO  If yes, where when, and reason for visit? 5. Would you like to speak with a health care team member about safety in your home? NO    6. Do you have an Advanced Directive/ Living Will in place?  NO  If yes, do we have a copy on file NO  If no, would you like information NO

## 2019-07-17 NOTE — PROGRESS NOTES
Written by Sadie Hood, as dictated by Dr. Hemalatha Molina MD.    Jerad Bess is a 71 y.o. male. HPI  The patient comes in today to establish care. He was previously under the care of Dr. Jimena Marcelino (). Records in [facility] reviewed. Patient requests a referral to Dr. Ron garcia (audiology) as he needs hearing aids. He had a hearing test 2 years ago and was told he needed hearing aids, but pt refused at that time. Patient has an appointment scheduled for 08/09/2019. BP is high today at 164/88, 152/92 on repeat. He attributes his elevated BP to White Coat Syndrome and stress getting to his appointment. Patient notes his BP is normally elevated when he goes to the doctor. He is not concerned about his BP as he does high intensity interval training 6 days per week and wears a heart monitor. The pt refuses to monitor his BP at home. Patient Active Problem List   Diagnosis Code   (none) - all problems resolved or deleted        Current Outpatient Medications on File Prior to Visit   Medication Sig Dispense Refill    ergocalciferol, vitamin D2, (VITAMIN D2 PO) Take  by mouth. No current facility-administered medications on file prior to visit.         Past Medical History:   Diagnosis Date    GERD (gastroesophageal reflux disease)     Hypercholesterolemia     Trauma mid 1990's    closed head trauma; no seizures/residua       Past Surgical History:   Procedure Laterality Date    ENDOSCOPY, COLON, DIAGNOSTIC  4/4/2004    Dr. Nessa Starr       Family History   Problem Relation Age of Onset    Alcohol abuse Father     Hypertension Brother     Heart Disease Brother 61        CABG @ 62    Hypertension Brother     Heart Attack Brother 61       Social History     Socioeconomic History    Marital status:      Spouse name: Not on file    Number of children: Not on file    Years of education: Not on file    Highest education level: Not on file   Occupational History    Occupation: . Social Needs    Financial resource strain: Not on file    Food insecurity:     Worry: Not on file     Inability: Not on file    Transportation needs:     Medical: Not on file     Non-medical: Not on file   Tobacco Use    Smoking status: Never Smoker    Smokeless tobacco: Never Used   Substance and Sexual Activity    Alcohol use: Yes     Comment: socially- couple of glasses each night    Drug use: No    Sexual activity: Yes     Partners: Female     Birth control/protection: Surgical   Lifestyle    Physical activity:     Days per week: Not on file     Minutes per session: Not on file    Stress: Not on file   Relationships    Social connections:     Talks on phone: Not on file     Gets together: Not on file     Attends Islam service: Not on file     Active member of club or organization: Not on file     Attends meetings of clubs or organizations: Not on file     Relationship status: Not on file    Intimate partner violence:     Fear of current or ex partner: Not on file     Emotionally abused: Not on file     Physically abused: Not on file     Forced sexual activity: Not on file   Other Topics Concern    Not on file   Social History Narrative    Not on file       Review of Systems   Constitutional: Negative for malaise/fatigue. HENT: Positive for hearing loss. Negative for congestion. Eyes: Negative for blurred vision and pain. Respiratory: Negative for cough and shortness of breath. Cardiovascular: Negative for chest pain and palpitations. Gastrointestinal: Negative for abdominal pain and heartburn. Genitourinary: Negative for frequency and urgency. Musculoskeletal: Negative for joint pain and myalgias. Neurological: Negative for dizziness, tingling, sensory change, weakness and headaches. Psychiatric/Behavioral: Negative for depression, memory loss and substance abuse.      Visit Vitals  BP (!) 152/92 (BP 1 Location: Left arm, BP Patient Position: Sitting)   Pulse 66   Temp 98.7 °F (37.1 °C) (Oral)   Resp 16   Ht 5' 11\" (1.803 m)   Wt 189 lb 9.6 oz (86 kg)   SpO2 98%   BMI 26.44 kg/m²       Physical Exam   Constitutional: He is oriented to person, place, and time. He appears well-developed and well-nourished. No distress. HENT:   Right Ear: External ear normal.   Left Ear: External ear normal.   Eyes: Conjunctivae and EOM are normal. Right eye exhibits no discharge. Left eye exhibits no discharge. Neck: Normal range of motion. Neck supple. Cardiovascular: Normal rate, regular rhythm and normal heart sounds. Pulmonary/Chest: Effort normal and breath sounds normal. He has no wheezes. Abdominal: Soft. Bowel sounds are normal. There is no tenderness. Lymphadenopathy:     He has no cervical adenopathy. Neurological: He is alert and oriented to person, place, and time. Skin: He is not diaphoretic. Psychiatric: He has a normal mood and affect. His behavior is normal.   Nursing note and vitals reviewed. ASSESSMENT and PLAN    ICD-10-CM ICD-9-CM    1. White coat syndrome without diagnosis of hypertension R03.0 796.2 Recommended monitoring BP at home, but pt refused. 2. Bilateral hearing loss, unspecified hearing loss type H91.93 389.9 REFERRAL TO AUDIOLOGY    Referred to audiology. Pt has an appointment on 08/09/2019. This plan was reviewed with the patient and patient agrees. All questions were answered. This scribe documentation was reviewed by me and accurately reflects the examination and decisions made by me. This note will not be viewable in [address]

## 2019-10-24 ENCOUNTER — OFFICE VISIT (OUTPATIENT)
Dept: PRIMARY CARE CLINIC | Age: 64
End: 2019-10-24

## 2019-10-24 VITALS
OXYGEN SATURATION: 99 % | RESPIRATION RATE: 16 BRPM | DIASTOLIC BLOOD PRESSURE: 89 MMHG | TEMPERATURE: 97.8 F | SYSTOLIC BLOOD PRESSURE: 143 MMHG | HEIGHT: 71 IN | HEART RATE: 68 BPM | BODY MASS INDEX: 25.9 KG/M2 | WEIGHT: 185 LBS

## 2019-10-24 DIAGNOSIS — Z00.00 PHYSICAL EXAM: Primary | ICD-10-CM

## 2019-10-24 DIAGNOSIS — E55.9 VITAMIN D DEFICIENCY: ICD-10-CM

## 2019-10-24 DIAGNOSIS — E78.2 MIXED HYPERLIPIDEMIA: ICD-10-CM

## 2019-10-24 DIAGNOSIS — Z11.59 NEED FOR HEPATITIS C SCREENING TEST: ICD-10-CM

## 2019-10-24 DIAGNOSIS — R59.0 ANTERIOR CERVICAL LYMPHADENOPATHY: ICD-10-CM

## 2019-10-24 NOTE — PROGRESS NOTES
1. Have you been to the ER, urgent care clinic since your last visit? Hospitalized since your last visit? No    2. Have you seen or consulted any other health care providers outside of the [address] [place] [address] since your last visit? Include any pap smears or colon screening.  No     Chief Complaint   Patient presents with    Annual Exam       Visit Vitals  /89 (BP 1 Location: Left arm, BP Patient Position: Sitting)   Pulse 68   Temp 97.8 °F (36.6 °C) (Oral)   Resp 16   Ht 5' 11\" (1.803 m)   Wt 185 lb (83.9 kg)   SpO2 99%   BMI 25.80 kg/m²       Health Maintenance Due   Topic Date Due    Hepatitis C Screening  1955    DTaP/Tdap/Td series (1 - Tdap) 11/08/2018    Shingrix Vaccine Age 50> (2 of 2) 01/02/2019    GLAUCOMA SCREENING Q2Y  05/13/2019    Pneumococcal 65+ years (1 of 2 - PCV13) 05/13/2019    Influenza Age 9 to Adult  08/01/2019

## 2019-10-24 NOTE — PROGRESS NOTES
Written by Yee Markham, as dictated by Dr. Juan Jose Pratt MD.    Lamont Jhaveri is a 71 y.o. male. HPI   The patient presents today for his annual physical. He does not have any complaints today. He does not reports any issues with seasonal allergies. He states that he sleeps well, per his Fitbit logs, and his wife does not complain of any snoring. He denies any headaches, constipation, URI symptoms, difficulty urinating, frequency, and dysuria. He reports that he does not have any issues with his cholesterol since he is \"very healthy\". Per his food log, he states that he does not eat pasta or bread, and he goes to intense interval training classes multiple times per week at [facility]. Patient Active Problem List   Diagnosis Code   (none) - all problems resolved or deleted        Current Outpatient Medications on File Prior to Visit   Medication Sig Dispense Refill    ergocalciferol, vitamin D2, (VITAMIN D2 PO) Take  by mouth two (2) times a day. No current facility-administered medications on file prior to visit. No Known Allergies    Past Medical History:   Diagnosis Date    GERD (gastroesophageal reflux disease)     Hypercholesterolemia     Trauma mid 1990's    closed head trauma; no seizures/residua       Past Surgical History:   Procedure Laterality Date    ENDOSCOPY, COLON, DIAGNOSTIC  4/4/2004    Dr. Cristian William       Family History   Problem Relation Age of Onset    Alcohol abuse Father     Hypertension Brother     Heart Disease Brother 61        CABG @ 62    Hypertension Brother     Heart Attack Brother 61       Social History     Socioeconomic History    Marital status:      Spouse name: Not on file    Number of children: Not on file    Years of education: Not on file    Highest education level: Not on file   Occupational History    Occupation: .    Social Needs    Financial resource strain: Not on file    Food insecurity:     Worry: Not on file     Inability: Not on file    Transportation needs:     Medical: Not on file     Non-medical: Not on file   Tobacco Use    Smoking status: Never Smoker    Smokeless tobacco: Never Used   Substance and Sexual Activity    Alcohol use: Yes     Comment: socially- couple of glasses each night    Drug use: No    Sexual activity: Yes     Partners: Female     Birth control/protection: Surgical   Lifestyle    Physical activity:     Days per week: Not on file     Minutes per session: Not on file    Stress: Not on file   Relationships    Social connections:     Talks on phone: Not on file     Gets together: Not on file     Attends Protestant service: Not on file     Active member of club or organization: Not on file     Attends meetings of clubs or organizations: Not on file     Relationship status: Not on file    Intimate partner violence:     Fear of current or ex partner: Not on file     Emotionally abused: Not on file     Physically abused: Not on file     Forced sexual activity: Not on file   Other Topics Concern    Not on file   Social History Narrative    Not on file       No visits with results within 3 Month(s) from this visit.    Latest known visit with results is:   Appointment on 11/15/2018   Component Date Value Ref Range Status    VITAMIN D, 25-HYDROXY 11/15/2018 16.3* 30.0 - 100.0 ng/mL Final    TSH 11/15/2018 1.360  0.450 - 4.500 uIU/mL Final    Specific Gravity 11/15/2018 1.023  1.005 - 1.030 Final    pH (UA) 11/15/2018 5.5  5.0 - 7.5 Final    Color 11/15/2018 Yellow  Yellow Final    Appearance 11/15/2018 Clear  Clear Final    Leukocyte Esterase 11/15/2018 Negative  Negative Final    Protein 11/15/2018 Negative  Negative/Trace Final    Glucose 11/15/2018 Negative  Negative Final    Ketone 11/15/2018 Negative  Negative Final    Blood 11/15/2018 Negative  Negative Final    Bilirubin 11/15/2018 Negative  Negative Final    Urobilinogen 11/15/2018 0.2  0.2 - 1.0 mg/dL Final    Nitrites 11/15/2018 Negative  Negative Final    Microscopic Examination 11/15/2018 Comment   Final    Microscopic not indicated and not performed.  Prostate Specific Ag 11/15/2018 0.8  0.0 - 4.0 ng/mL Final    Reflex Criteria 11/15/2018 Comment   Final    Cholesterol, total 11/15/2018 223* 100 - 199 mg/dL Final    Triglyceride 11/15/2018 119  0 - 149 mg/dL Final    HDL Cholesterol 11/15/2018 67  >39 mg/dL Final    VLDL, calculated 11/15/2018 24  5 - 40 mg/dL Final    LDL, calculated 11/15/2018 132* 0 - 99 mg/dL Final    Glucose 11/15/2018 94  65 - 99 mg/dL Final    BUN 11/15/2018 20  8 - 27 mg/dL Final    Creatinine 11/15/2018 1.13  0.76 - 1.27 mg/dL Final    GFR est non-AA 11/15/2018 68  >59 mL/min/1.73 Final    GFR est AA 11/15/2018 79  >59 mL/min/1.73 Final    BUN/Creatinine ratio 11/15/2018 18  10 - 24 Final    Sodium 11/15/2018 143  134 - 144 mmol/L Final    Potassium 11/15/2018 4.4  3.5 - 5.2 mmol/L Final    Chloride 11/15/2018 103  96 - 106 mmol/L Final    CO2 11/15/2018 21  20 - 29 mmol/L Final    Calcium 11/15/2018 9.0  8.6 - 10.2 mg/dL Final    Protein, total 11/15/2018 6.9  6.0 - 8.5 g/dL Final    Albumin 11/15/2018 4.6  3.6 - 4.8 g/dL Final    GLOBULIN, TOTAL 11/15/2018 2.3  1.5 - 4.5 g/dL Final    A-G Ratio 11/15/2018 2.0  1.2 - 2.2 Final    Bilirubin, total 11/15/2018 0.5  0.0 - 1.2 mg/dL Final    Alk.  phosphatase 11/15/2018 55  39 - 117 IU/L Final    AST (SGOT) 11/15/2018 20  0 - 40 IU/L Final    ALT (SGPT) 11/15/2018 19  0 - 44 IU/L Final    WBC 11/15/2018 6.5  3.4 - 10.8 x10E3/uL Final    RBC 11/15/2018 5.12  4.14 - 5.80 x10E6/uL Final    HGB 11/15/2018 15.0  13.0 - 17.7 g/dL Final    HCT 11/15/2018 45.4  37.5 - 51.0 % Final    MCV 11/15/2018 89  79 - 97 fL Final    MCH 11/15/2018 29.3  26.6 - 33.0 pg Final    MCHC 11/15/2018 33.0  31.5 - 35.7 g/dL Final    RDW 11/15/2018 14.8  12.3 - 15.4 % Final    PLATELET 24/23/1475 273  150 - 379 x10E3/uL Final    NEUTROPHILS 11/15/2018 64  Not Estab. % Final    Lymphocytes 11/15/2018 24  Not Estab. % Final    MONOCYTES 11/15/2018 7  Not Estab. % Final    EOSINOPHILS 11/15/2018 4  Not Estab. % Final    BASOPHILS 11/15/2018 1  Not Estab. % Final    ABS. NEUTROPHILS 11/15/2018 4.2  1.4 - 7.0 x10E3/uL Final    Abs Lymphocytes 11/15/2018 1.5  0.7 - 3.1 x10E3/uL Final    ABS. MONOCYTES 11/15/2018 0.5  0.1 - 0.9 x10E3/uL Final    ABS. EOSINOPHILS 11/15/2018 0.2  0.0 - 0.4 x10E3/uL Final    ABS. BASOPHILS 11/15/2018 0.0  0.0 - 0.2 x10E3/uL Final    IMMATURE GRANULOCYTES 11/15/2018 0  Not Estab. % Final    ABS. IMM. GRANS. 11/15/2018 0.0  0.0 - 0.1 x10E3/uL Final    INTERPRETATION 11/15/2018 Note   Final    Supplemental report is available. Review of Systems   Constitutional: Negative for malaise/fatigue and weight loss. HENT: Negative for congestion, hearing loss and sore throat. Eyes: Negative for blurred vision and photophobia. Respiratory: Negative for cough and shortness of breath. Cardiovascular: Negative for chest pain and leg swelling. Gastrointestinal: Negative for constipation, diarrhea and heartburn. Genitourinary: Negative for dysuria, frequency and urgency. Musculoskeletal: Negative for joint pain and myalgias. Neurological: Negative for dizziness and headaches. Psychiatric/Behavioral: Negative for depression and substance abuse. The patient is not nervous/anxious and does not have insomnia. All other systems reviewed and are negative. Visit Vitals  /89 (BP 1 Location: Left arm, BP Patient Position: Sitting)   Pulse 68   Temp 97.8 °F (36.6 °C) (Oral)   Resp 16   Ht 5' 11\" (1.803 m)   Wt 185 lb (83.9 kg)   SpO2 99%   BMI 25.80 kg/m²       Physical Exam   Constitutional: He is oriented to person, place, and time. He appears well-developed and well-nourished. No distress. HENT:   Head: Normocephalic and atraumatic.    Right Ear: External ear and ear canal normal.   Left Ear: External ear and ear canal normal.   Cerumen present in bilateral ears. He has hearing aids. Eyes: Pupils are equal, round, and reactive to light. Conjunctivae and EOM are normal. Right eye exhibits no discharge. Left eye exhibits no discharge. Neck: Normal range of motion. Neck supple. No thyroid mass and no thyromegaly present. Cardiovascular: Normal rate, regular rhythm and normal heart sounds. Exam reveals no gallop and no friction rub. No murmur heard. Pulses:       Dorsalis pedis pulses are 2+ on the right side, and 2+ on the left side. Pulmonary/Chest: Effort normal and breath sounds normal. He has no wheezes. Abdominal: Soft. Bowel sounds are normal. There is no tenderness. Musculoskeletal: Normal range of motion. He exhibits no tenderness. Lymphadenopathy:     He has cervical adenopathy. Neurological: He is alert and oriented to person, place, and time. He has normal reflexes. Reflex Scores:       Patellar reflexes are 2+ on the right side and 2+ on the left side. Skin: He is not diaphoretic. Psychiatric: He has a normal mood and affect. His behavior is normal. Thought content normal.   Nursing note and vitals reviewed. ASSESSMENT and PLAN    ICD-10-CM ICD-9-CM    1. Physical exam A00.71 L57.3 METABOLIC PANEL, COMPREHENSIVE      CBC W/O DIFF      LIPID PANEL      TSH 3RD GENERATION      PSA, DIAGNOSTIC (PROSTATE SPECIFIC AG)  Labs will be drawn today. 2. Vitamin D deficiency E55.9 268.9 VITAMIN D, 25 HYDROXY  Labs will be drawn today. 3. Mixed hyperlipidemia E78.2 272.2 Informed pt that maintaining exercise and low carb diet is important to keep LDL at goal.   4. Anterior cervical lymphadenopathy R59.0 785.6 Instructed pt to monitor cervical lymph nodes and let us know if they get larger or more bothersome. 5. Need for hepatitis C screening test Z11.59 V73.89 HEPATITIS C AB  Labs drawn today.         This plan was reviewed with the patient and patient agrees. All questions were answered. This scribe documentation was reviewed by me and accurately reflects the examination and decisions made by me. This note will not be viewable in [address]

## 2019-10-25 LAB
25(OH)D3+25(OH)D2 SERPL-MCNC: 37 NG/ML (ref 30–100)
ALBUMIN SERPL-MCNC: 4.7 G/DL (ref 3.6–4.8)
ALBUMIN/GLOB SERPL: 2 {RATIO} (ref 1.2–2.2)
ALP SERPL-CCNC: 50 IU/L (ref 39–117)
ALT SERPL-CCNC: 18 IU/L (ref 0–44)
AST SERPL-CCNC: 20 IU/L (ref 0–40)
BILIRUB SERPL-MCNC: 0.6 MG/DL (ref 0–1.2)
BUN SERPL-MCNC: 15 MG/DL (ref 8–27)
BUN/CREAT SERPL: 13 (ref 10–24)
CALCIUM SERPL-MCNC: 9.2 MG/DL (ref 8.6–10.2)
CHLORIDE SERPL-SCNC: 105 MMOL/L (ref 96–106)
CHOLEST SERPL-MCNC: 193 MG/DL (ref 100–199)
CO2 SERPL-SCNC: 23 MMOL/L (ref 20–29)
CREAT SERPL-MCNC: 1.16 MG/DL (ref 0.76–1.27)
ERYTHROCYTE [DISTWIDTH] IN BLOOD BY AUTOMATED COUNT: 14.1 % (ref 12.3–15.4)
GLOBULIN SER CALC-MCNC: 2.3 G/DL (ref 1.5–4.5)
GLUCOSE SERPL-MCNC: 93 MG/DL (ref 65–99)
HCT VFR BLD AUTO: 42.6 % (ref 37.5–51)
HCV AB S/CO SERPL IA: <0.1 S/CO RATIO (ref 0–0.9)
HDLC SERPL-MCNC: 76 MG/DL
HGB BLD-MCNC: 15 G/DL (ref 13–17.7)
LDLC SERPL CALC-MCNC: 106 MG/DL (ref 0–99)
MCH RBC QN AUTO: 31 PG (ref 26.6–33)
MCHC RBC AUTO-ENTMCNC: 35.2 G/DL (ref 31.5–35.7)
MCV RBC AUTO: 88 FL (ref 79–97)
PLATELET # BLD AUTO: 273 X10E3/UL (ref 150–450)
POTASSIUM SERPL-SCNC: 4.4 MMOL/L (ref 3.5–5.2)
PROT SERPL-MCNC: 7 G/DL (ref 6–8.5)
PSA SERPL-MCNC: 0.7 NG/ML (ref 0–4)
RBC # BLD AUTO: 4.84 X10E6/UL (ref 4.14–5.8)
SODIUM SERPL-SCNC: 145 MMOL/L (ref 134–144)
TRIGL SERPL-MCNC: 56 MG/DL (ref 0–149)
TSH SERPL DL<=0.005 MIU/L-ACNC: 1.04 UIU/ML (ref 0.45–4.5)
VLDLC SERPL CALC-MCNC: 11 MG/DL (ref 5–40)
WBC # BLD AUTO: 5.3 X10E3/UL (ref 3.4–10.8)

## 2019-10-28 NOTE — PROGRESS NOTES
Let him know blood report looks good. Cholesterol numbers & vitamin D have improved. Keep up the good work!!! Please complete his form. We were waiting for his lipid panel.

## 2019-10-29 DIAGNOSIS — Z12.11 COLON CANCER SCREENING: Primary | ICD-10-CM

## 2019-11-05 ENCOUNTER — TELEPHONE (OUTPATIENT)
Dept: PRIMARY CARE CLINIC | Age: 64
End: 2019-11-05

## 2019-11-05 NOTE — TELEPHONE ENCOUNTER
Patient called and stated he has some questions in regards to physical he had, did not wish to state anymore

## 2020-11-23 ENCOUNTER — TELEPHONE (OUTPATIENT)
Dept: INTERNAL MEDICINE CLINIC | Age: 65
End: 2020-11-23

## 2020-11-30 ENCOUNTER — OFFICE VISIT (OUTPATIENT)
Dept: PRIMARY CARE CLINIC | Age: 65
End: 2020-11-30
Payer: COMMERCIAL

## 2020-11-30 VITALS
SYSTOLIC BLOOD PRESSURE: 170 MMHG | WEIGHT: 192.8 LBS | HEIGHT: 71 IN | OXYGEN SATURATION: 97 % | TEMPERATURE: 97.7 F | BODY MASS INDEX: 26.99 KG/M2 | RESPIRATION RATE: 16 BRPM | HEART RATE: 73 BPM | DIASTOLIC BLOOD PRESSURE: 108 MMHG

## 2020-11-30 DIAGNOSIS — H91.93 BILATERAL HEARING LOSS, UNSPECIFIED HEARING LOSS TYPE: ICD-10-CM

## 2020-11-30 DIAGNOSIS — R03.0 ELEVATED BLOOD PRESSURE READING: ICD-10-CM

## 2020-11-30 DIAGNOSIS — K11.8 SUBMANDIBULAR GLAND MASS: ICD-10-CM

## 2020-11-30 DIAGNOSIS — Z12.5 PROSTATE CANCER SCREENING: ICD-10-CM

## 2020-11-30 DIAGNOSIS — Z00.00 ANNUAL PHYSICAL EXAM: ICD-10-CM

## 2020-11-30 DIAGNOSIS — H26.9 IMMATURE CATARACT OF BOTH EYES: ICD-10-CM

## 2020-11-30 DIAGNOSIS — E55.9 VITAMIN D DEFICIENCY: ICD-10-CM

## 2020-11-30 DIAGNOSIS — Z00.00 ANNUAL PHYSICAL EXAM: Primary | ICD-10-CM

## 2020-11-30 DIAGNOSIS — T16.2XXA FOREIGN BODY OF LEFT EAR, INITIAL ENCOUNTER: ICD-10-CM

## 2020-11-30 DIAGNOSIS — R94.31 ABNORMAL EKG: ICD-10-CM

## 2020-11-30 PROCEDURE — 93000 ELECTROCARDIOGRAM COMPLETE: CPT | Performed by: INTERNAL MEDICINE

## 2020-11-30 PROCEDURE — 99397 PER PM REEVAL EST PAT 65+ YR: CPT | Performed by: INTERNAL MEDICINE

## 2020-11-30 NOTE — PROGRESS NOTES
Chief Complaint   Patient presents with    Complete Physical     Patient is fasting. Reports he had a flu vaccine. Denies any health concerns.

## 2020-12-03 NOTE — PROGRESS NOTES
Teo Echeverria is a 76 y.o.  male and presents with     Chief Complaint   Patient presents with    Complete Physical     Patient is fasting. Reports he had a flu vaccine. Denies any health concerns. Patient is here for a physical.  He has a history of impaired hearing and wears hearing aids in both ears. Today his blood pressure is elevated. Patient says that it always goes up when he is in the doctor's office. He does not think he has hypertension. He denies any chest pain, shortness of breath, orthopnea or PND. He says he is very active. Past Medical History:   Diagnosis Date    GERD (gastroesophageal reflux disease)     Hypercholesterolemia     Trauma mid 1990's    closed head trauma; no seizures/residua     Past Surgical History:   Procedure Laterality Date    ENDOSCOPY, COLON, DIAGNOSTIC  4/4/2004    Dr. Cindy Monahan     Current Outpatient Medications   Medication Sig    ergocalciferol, vitamin D2, (VITAMIN D2 PO) Take  by mouth two (2) times a day. No current facility-administered medications for this visit. Health Maintenance   Topic Date Due    DTaP/Tdap/Td series (1 - Tdap) 05/13/1975    Shingrix Vaccine Age 50> (2 of 2) 01/02/2019    GLAUCOMA SCREENING Q2Y  05/13/2019    Pneumococcal 65+ years (1 of 1 - PPSV23) 05/13/2019    Flu Vaccine (1) 09/01/2020    Lipid Screen  10/24/2024    Colorectal Cancer Screening Combo  10/15/2025    Hepatitis C Screening  Completed     Immunization History   Administered Date(s) Administered    Influenza Vaccine 10/18/2018, 10/20/2019    Influenza Vaccine (Quad) PF (>6 Mo Flulaval, Fluarix, and >3 Yrs Afluria, Fluzone [id number]) 11/09/2017    Influenza Vaccine Split 11/01/2010    Td 11/07/2018    Tdap 11/07/2018    Zoster Recombinant 11/07/2018     No LMP for male patient.         Allergies and Intolerances:   No Known Allergies    Family History:   Family History   Problem Relation Age of Onset    Alcohol abuse Father     Hypertension Brother     Heart Disease Brother 61        CABG @ 62    Hypertension Brother     Heart Attack Brother 61       Social History:   He  reports that he has never smoked. He has never used smokeless tobacco.  He  reports current alcohol use. Review of Systems:   General: negative for - chills, fatigue, fever, weight change  Psych: negative for - anxiety, depression, irritability or mood swings  ENT: negative for - headaches, hearing change, nasal congestion, oral lesions, sneezing or sore throat  Heme/ Lymph: negative for - bleeding problems, bruising, pallor or swollen lymph nodes  Endo: negative for - hot flashes, polydipsia/polyuria or temperature intolerance  Resp: negative for - cough, shortness of breath or wheezing  CV: negative for - chest pain, edema or palpitations  GI: negative for - abdominal pain, change in bowel habits, constipation, diarrhea or nausea/vomiting  : negative for - dysuria, hematuria, incontinence, pelvic pain or vulvar/vaginal symptoms  MSK: negative for - joint pain, joint swelling or muscle pain  Neuro: negative for - confusion, headaches, seizures or weakness  Derm: negative for - dry skin, hair changes, rash or skin lesion changes          Physical:   Vitals:   Vitals:    11/30/20 1342 11/30/20 1421   BP: (!) 161/94 (!) 170/108   Pulse: 73    Resp: 16    Temp: 97.7 °F (36.5 °C)    TempSrc: Oral    SpO2: 97%    Weight: 192 lb 12.8 oz (87.5 kg)    Height: 5' 11\" (1.803 m)            Exam:   HEENT- atraumatic,normocephalic, awake, oriented, well nourished, broken piece of hearing aid in the left ear canal.,  Immature cataract in both eyes  Neck - supple,no enlarged lymph nodes, no JVD, no thyromegaly, mass felt in the left submandibular region, at least an inch in size, firm, not attached to the overlying skin.   Chest- CTA, no rhonchi, no crackles  Heart- rrr, no murmurs / gallop/rub  Abdomen- soft,BS+,NT, no hepatosplenomegaly  Ext - no c/c/edema   Neuro- no focal deficits. Power 5/5 all extremities  Skin - warm,dry, no obvious rashes. Review of Data:   LABS:   Lab Results   Component Value Date/Time    WBC 5.3 10/24/2019 10:40 AM    HGB 15.0 10/24/2019 10:40 AM    HCT 42.6 10/24/2019 10:40 AM    PLATELET 119 39/46/8849 10:40 AM     Lab Results   Component Value Date/Time    Sodium 145 (H) 10/24/2019 10:40 AM    Potassium 4.4 10/24/2019 10:40 AM    Chloride 105 10/24/2019 10:40 AM    CO2 23 10/24/2019 10:40 AM    Glucose 93 10/24/2019 10:40 AM    BUN 15 10/24/2019 10:40 AM    Creatinine 1.16 10/24/2019 10:40 AM     Lab Results   Component Value Date/Time    Cholesterol, total 193 10/24/2019 10:40 AM    HDL Cholesterol 76 10/24/2019 10:40 AM    LDL, calculated 106 (H) 10/24/2019 10:40 AM    Triglyceride 56 10/24/2019 10:40 AM     No components found for: GPT        Impression / Plan:        ICD-10-CM ICD-9-CM    1. Annual physical exam  Z00.00 V70.0 AMB POC EKG ROUTINE W/ 12 LEADS, INTER & REP      CANCELED: CBC WITH AUTOMATED DIFF      CANCELED: METABOLIC PANEL, COMPREHENSIVE      CANCELED: LIPID PANEL   2. Vitamin D deficiency  E55.9 268.9 VITAMIN D, 25 HYDROXY   3. Bilateral hearing loss, unspecified hearing loss type  H91.93 389.9    4. Prostate cancer screening  Z12.5 V76.44 CANCELED: PSA, DIAGNOSTIC (PROSTATE SPECIFIC AG)   5. Foreign body of left ear, initial encounter  T16. 5YMT 419      Q955    6. Elevated blood pressure reading  R03.0 796.2 AMB POC EKG ROUTINE W/ 12 LEADS, INTER & REP      REFERRAL TO CARDIOLOGY   7. Submandibular gland mass  K11.8 527.8 CT NECK SOFT TISSUE W WO CONT   8. Immature cataract of both eyes  H26.9 366.9    9. Abnormal EKG  R94.31 794.31 REFERRAL TO CARDIOLOGY     EKG shows T wave inversion in the lateral leads, we will get opinion from cardiology. Elevated blood pressure reading-advised patient to monitor his blood pressure, low-salt diet. Patient does not want to be on blood pressure medications at this time.   Advised patient that it is very important for him to monitor the blood pressure and make sure it is normal.  If it stays elevated he needs to follow-up. Foreign body in the left ear canal-patient mentioned that he will go to his audiologist to get it removed. Immature cataracts in both eyes-patient does not have any visual difficulties at this time. Explained to patient risk benefits of the medications. Advised patient to stop meds if having any side effects. Pt verbalized understanding of the instructions. I have discussed the diagnosis with the patient and the intended plan as seen in the above orders. The patient has received an after-visit summary and questions were answered concerning future plans. I have discussed medication side effects and warnings with the patient as well. I have reviewed the plan of care with the patient, accepted their input and they are in agreement with the treatment goals. Reviewed plan of care. Patient has provided input and agrees with goals. Follow-up and Dispositions    · Return in about 1 year (around 11/30/2021).          aKle Miramontes MD

## 2020-12-04 LAB
25(OH)D3+25(OH)D2 SERPL-MCNC: 32.4 NG/ML (ref 30–100)
ALBUMIN SERPL-MCNC: 4.5 G/DL (ref 3.8–4.8)
ALBUMIN/GLOB SERPL: 1.8 {RATIO} (ref 1.2–2.2)
ALP SERPL-CCNC: 65 IU/L (ref 39–117)
ALT SERPL-CCNC: 28 IU/L (ref 0–44)
AST SERPL-CCNC: 20 IU/L (ref 0–40)
BASOPHILS # BLD AUTO: 0.1 X10E3/UL (ref 0–0.2)
BASOPHILS NFR BLD AUTO: 1 %
BILIRUB SERPL-MCNC: 0.6 MG/DL (ref 0–1.2)
BUN SERPL-MCNC: 18 MG/DL (ref 8–27)
BUN/CREAT SERPL: 15 (ref 10–24)
CALCIUM SERPL-MCNC: 9.5 MG/DL (ref 8.6–10.2)
CHLORIDE SERPL-SCNC: 103 MMOL/L (ref 96–106)
CHOLEST SERPL-MCNC: 216 MG/DL (ref 100–199)
CO2 SERPL-SCNC: 25 MMOL/L (ref 20–29)
CREAT SERPL-MCNC: 1.18 MG/DL (ref 0.76–1.27)
EOSINOPHIL # BLD AUTO: 0.2 X10E3/UL (ref 0–0.4)
EOSINOPHIL NFR BLD AUTO: 3 %
ERYTHROCYTE [DISTWIDTH] IN BLOOD BY AUTOMATED COUNT: 13.2 % (ref 11.6–15.4)
GLOBULIN SER CALC-MCNC: 2.5 G/DL (ref 1.5–4.5)
GLUCOSE SERPL-MCNC: 88 MG/DL (ref 65–99)
HCT VFR BLD AUTO: 45.2 % (ref 37.5–51)
HDLC SERPL-MCNC: 62 MG/DL
HGB BLD-MCNC: 15.5 G/DL (ref 13–17.7)
IMM GRANULOCYTES # BLD AUTO: 0 X10E3/UL (ref 0–0.1)
IMM GRANULOCYTES NFR BLD AUTO: 0 %
LDLC SERPL CALC-MCNC: 135 MG/DL (ref 0–99)
LYMPHOCYTES # BLD AUTO: 1.9 X10E3/UL (ref 0.7–3.1)
LYMPHOCYTES NFR BLD AUTO: 29 %
MCH RBC QN AUTO: 30 PG (ref 26.6–33)
MCHC RBC AUTO-ENTMCNC: 34.3 G/DL (ref 31.5–35.7)
MCV RBC AUTO: 88 FL (ref 79–97)
MONOCYTES # BLD AUTO: 0.5 X10E3/UL (ref 0.1–0.9)
MONOCYTES NFR BLD AUTO: 8 %
NEUTROPHILS # BLD AUTO: 3.8 X10E3/UL (ref 1.4–7)
NEUTROPHILS NFR BLD AUTO: 59 %
PLATELET # BLD AUTO: 288 X10E3/UL (ref 150–450)
POTASSIUM SERPL-SCNC: 4.4 MMOL/L (ref 3.5–5.2)
PROT SERPL-MCNC: 7 G/DL (ref 6–8.5)
PSA SERPL-MCNC: 0.8 NG/ML (ref 0–4)
RBC # BLD AUTO: 5.16 X10E6/UL (ref 4.14–5.8)
SODIUM SERPL-SCNC: 140 MMOL/L (ref 134–144)
TRIGL SERPL-MCNC: 108 MG/DL (ref 0–149)
VLDLC SERPL CALC-MCNC: 19 MG/DL (ref 5–40)
WBC # BLD AUTO: 6.4 X10E3/UL (ref 3.4–10.8)

## 2020-12-04 NOTE — PROGRESS NOTES
Blood count, sugars, kidney, liver function,vitamin d are all normal.PSA is normal .  Chol is slightly elevated, would recommend low fat diet. MOnitor blood pressure.

## 2020-12-08 ENCOUNTER — TELEPHONE (OUTPATIENT)
Dept: PRIMARY CARE CLINIC | Age: 65
End: 2020-12-08

## 2020-12-10 NOTE — TELEPHONE ENCOUNTER
Patient has cancelled CT scan, he had one in Feb where they said it was all normal. Patient has gotten CD from CT Scan, advised we do not have disc readers here and asked him to get the \"final report\" from the office he had the CT done at. He verbalized his understanding and thanked me.

## 2020-12-21 ENCOUNTER — TELEPHONE (OUTPATIENT)
Dept: CARDIOLOGY CLINIC | Age: 65
End: 2020-12-21

## 2021-01-15 ENCOUNTER — OFFICE VISIT (OUTPATIENT)
Dept: CARDIOLOGY CLINIC | Age: 66
End: 2021-01-15
Payer: COMMERCIAL

## 2021-01-15 VITALS
SYSTOLIC BLOOD PRESSURE: 168 MMHG | OXYGEN SATURATION: 97 % | WEIGHT: 193 LBS | BODY MASS INDEX: 26.92 KG/M2 | DIASTOLIC BLOOD PRESSURE: 110 MMHG | HEART RATE: 81 BPM

## 2021-01-15 DIAGNOSIS — R94.31 ABNORMAL ECG: ICD-10-CM

## 2021-01-15 DIAGNOSIS — I10 ESSENTIAL HYPERTENSION: Primary | ICD-10-CM

## 2021-01-15 DIAGNOSIS — E78.49 OTHER HYPERLIPIDEMIA: ICD-10-CM

## 2021-01-15 PROCEDURE — 99204 OFFICE O/P NEW MOD 45 MIN: CPT | Performed by: SPECIALIST

## 2021-01-15 NOTE — PROGRESS NOTES
[facility]                                                            OFFICE NOTE        Janet Roche M.D.,FABRICIO Aydin Sesay   1955  [id number]    Date/Time:  1/15/841393:59 AM          SUBJECTIVE:  No complaints very active   no cp or sb or palpitations   Exercises almost daily with no issues       Assessment/Plan  1. Abnormal EKG: I discussed with the patient significance of his abnormal EKG. Given his risk factors further evaluation is needed. The electrocardiogram was normal sinus rhythm with ST-T wave abnormalities in the inferolateral leads. Proceed with stress echocardiogram at the very latest.    If stress echocardiogram is normal then I will recommend a calcium score. His cholesterol is borderline I feel that the calcium score may help her with decision regarding the initiation of statin therapy. 2.  Hypertension: He remains quite hypertensive. Proceed with Norvasc for now 5 mg daily. 3.  Hyperlipidemia: Borderline positive followed by his primary care physician. Will recommend proceeding with calcium score to see if statins are needed. See him back after the stress echocardiogram.    He declines BP meds at this time  He will check it at home and let me know           HPI   Very pleasant 76years old gentleman with apparently unremarkable past medical history. He only takes vitamin D. He does have borderline hyperlipidemia. He denies any history of hypertension or diabetes. Is here today for cardiac evaluation abnormal electrocardiogram milligrams. Past medical history: As above. Past surgical history: None. Social history: He does not smoke and drinks occasionally , he is a . Family history: Both of his brothers had myocardial infarction and CAD in mid 46s.               CARDIAC STUDIES EKG Results     None              IMAGING      MRI Results (most recent):  No results found for this or any previous visit. CT Results (most recent):  No results found for this or any previous visit. XR Results (most recent):          Past Medical History:   Diagnosis Date    GERD (gastroesophageal reflux disease)     Hypercholesterolemia     Trauma mid 1990's    closed head trauma; no seizures/residua     Past Surgical History:   Procedure Laterality Date    ENDOSCOPY, COLON, DIAGNOSTIC  4/4/2004    Dr. Alden Tubbs     Social History     Tobacco Use    Smoking status: Never Smoker    Smokeless tobacco: Never Used   Substance Use Topics    Alcohol use: Yes     Comment: socially- couple of glasses each night    Drug use: No     Family History   Problem Relation Age of Onset    Alcohol abuse Father     Hypertension Brother     Heart Disease Brother 61        CABG @ 62    Hypertension Brother     Heart Attack Brother 61     No Known Allergies      Visit Vitals  BP (!) 170/112   Pulse 81   Wt 193 lb (87.5 kg)   SpO2 97%   BMI 26.92 kg/m²         Last 3 Recorded Weights in this Encounter    01/15/21 1123   Weight: 193 lb (87.5 kg)            Review of Systems:   Pertinent items are noted in the History of Present Illness. Visit Vitals  BP (!) 170/112   Pulse 81   Wt 193 lb (87.5 kg)   SpO2 97%   BMI 26.92 kg/m²     General Appearance:  Well developed, well nourished,alert and oriented x 3, and individual in no acute distress. Ears/Nose/Mouth/Throat:   Hearing grossly normal.         Neck: Supple. Chest:   Lungs clear to auscultation bilaterally. Cardiovascular:  Regular rate and rhythm, S1, S2 normal, no murmur. Abdomen:   Soft, non-tender, bowel sounds are active. Extremities: No edema bilaterally. Skin: Warm and dry.                  Current Outpatient Medications on File Prior to Visit   Medication Sig Dispense Refill    ergocalciferol, vitamin D2, (VITAMIN D2 PO) Take  by mouth daily. No current facility-administered medications on file prior to visit. Anibal Brown had no medications administered during this visit. Current Outpatient Medications   Medication Sig    ergocalciferol, vitamin D2, (VITAMIN D2 PO) Take  by mouth daily. No current facility-administered medications for this visit. Lab Results   Component Value Date/Time    Cholesterol, total 216 (H) 11/30/2020 12:00 AM    HDL Cholesterol 62 11/30/2020 12:00 AM    LDL, calculated 135 (H) 11/30/2020 12:00 AM    LDL, calculated 106 (H) 10/24/2019 10:40 AM    VLDL, calculated 19 11/30/2020 12:00 AM    VLDL, calculated 11 10/24/2019 10:40 AM    Triglyceride 108 11/30/2020 12:00 AM       Lab Results   Component Value Date/Time    Sodium 140 11/30/2020 12:00 AM    Potassium 4.4 11/30/2020 12:00 AM    Chloride 103 11/30/2020 12:00 AM    CO2 25 11/30/2020 12:00 AM    Glucose 88 11/30/2020 12:00 AM    BUN 18 11/30/2020 12:00 AM    Creatinine 1.18 11/30/2020 12:00 AM    BUN/Creatinine ratio 15 11/30/2020 12:00 AM    GFR est AA 74 11/30/2020 12:00 AM    GFR est non-AA 64 11/30/2020 12:00 AM    Calcium 9.5 11/30/2020 12:00 AM       Lab Results   Component Value Date/Time    ALT (SGPT) 28 11/30/2020 12:00 AM    Alk.  phosphatase 65 11/30/2020 12:00 AM    Bilirubin, total 0.6 11/30/2020 12:00 AM       Lab Results   Component Value Date/Time    WBC 6.4 11/30/2020 12:00 AM    HGB 15.5 11/30/2020 12:00 AM    HCT 45.2 11/30/2020 12:00 AM    PLATELET 766 23/62/6391 12:00 AM    MCV 88 11/30/2020 12:00 AM       Lab Results   Component Value Date/Time    TSH 1.040 10/24/2019 10:40 AM         Lab Results   Component Value Date/Time    Cholesterol, total 216 (H) 11/30/2020 12:00 AM    Cholesterol, total 193 10/24/2019 10:40 AM    Cholesterol, total 223 (H) 11/15/2018 08:05 AM    Cholesterol, total 193 11/09/2017 12:00 AM    Cholesterol, total 196 01/25/2011 12:00 AM    HDL Cholesterol 62 11/30/2020 12:00 AM HDL Cholesterol 76 10/24/2019 10:40 AM    HDL Cholesterol 67 11/15/2018 08:05 AM    HDL Cholesterol 63 11/09/2017 12:00 AM    HDL Cholesterol 67 01/25/2011 12:00 AM    LDL, calculated 135 (H) 11/30/2020 12:00 AM    LDL, calculated 106 (H) 10/24/2019 10:40 AM    LDL, calculated 132 (H) 11/15/2018 08:05 AM    LDL, calculated 112 (H) 11/09/2017 12:00 AM    LDL, calculated 113 (H) 01/25/2011 12:00 AM    Triglyceride 108 11/30/2020 12:00 AM    Triglyceride 56 10/24/2019 10:40 AM    Triglyceride 119 11/15/2018 08:05 AM    Triglyceride 90 11/09/2017 12:00 AM    Triglyceride 80 01/25/2011 12:00 AM                Please note that this dictation was completed with Poll[employer], the [employer] voice recognition software. Quite often unanticipated grammatical, syntax, homophones, and other interpretative errors are inadvertently transcribed by the computer software. Please disregard these errors. Please excuse any errors that have escaped final proofreading.

## 2021-01-15 NOTE — PATIENT INSTRUCTIONS
You will be scheduled for a Treadmill Stress Test after your appointment today. Please wear comfortable clothing (shorts or pants with a shirt or blouse) and walking/athletic shoes. Do not eat or drink anything, except water, for at least 2 hours prior to your test. 
 
Do take your scheduled medications prior to your test. 
 
Follow up with Dr. Jaja Sr after your test in 1 week. Please see brochure about calcium scoring and schedule appointment if you are able. Call our office three days after regarding your result at [phone]

## 2021-01-20 ENCOUNTER — TRANSCRIBE ORDER (OUTPATIENT)
Dept: REGISTRATION | Age: 66
End: 2021-01-20

## 2021-01-20 DIAGNOSIS — Z29.9 PREVENTIVE MEASURE: Primary | ICD-10-CM

## 2021-01-22 ENCOUNTER — ANCILLARY PROCEDURE (OUTPATIENT)
Dept: CARDIOLOGY CLINIC | Age: 66
End: 2021-01-22

## 2021-01-22 ENCOUNTER — APPOINTMENT (OUTPATIENT)
Dept: CARDIOLOGY CLINIC | Age: 66
End: 2021-01-22

## 2021-01-22 DIAGNOSIS — E78.49 OTHER HYPERLIPIDEMIA: ICD-10-CM

## 2021-01-22 DIAGNOSIS — I10 ESSENTIAL HYPERTENSION: ICD-10-CM

## 2021-01-22 DIAGNOSIS — R94.31 ABNORMAL ECG: ICD-10-CM

## 2021-01-22 RX ORDER — AMLODIPINE BESYLATE 2.5 MG/1
2.5 TABLET ORAL DAILY
Qty: 90 TAB | Refills: 1 | Status: SHIPPED | OUTPATIENT
Start: 2021-01-22 | End: 2021-01-28 | Stop reason: SDUPTHER

## 2021-01-25 ENCOUNTER — TELEPHONE (OUTPATIENT)
Dept: CARDIOLOGY CLINIC | Age: 66
End: 2021-01-25

## 2021-01-25 NOTE — TELEPHONE ENCOUNTER
TC to pt, ID verified. Advised pt he does not need VV for tomorrow as his stress test got moved.  Changed his VV follow up to after his stress test.

## 2021-01-25 NOTE — TELEPHONE ENCOUNTER
Patient because he didn't have the stress test last week and wanted to know if he need to keep his vv appt for tomorrow    He can be reached at [phone]    [facility]

## 2021-01-28 ENCOUNTER — TELEPHONE (OUTPATIENT)
Dept: CARDIOLOGY CLINIC | Age: 66
End: 2021-01-28

## 2021-01-28 RX ORDER — AMLODIPINE BESYLATE 5 MG/1
5 TABLET ORAL DAILY
Qty: 90 TAB | Refills: 1 | Status: SHIPPED | OUTPATIENT
Start: 2021-01-28 | End: 2021-02-10

## 2021-01-28 NOTE — TELEPHONE ENCOUNTER
Patient requesting a call back in  regards to amlodipine. States he would like to know if it should be increased since his bp is still high.      Phone: [phone]

## 2021-02-01 ENCOUNTER — HOSPITAL ENCOUNTER (OUTPATIENT)
Dept: CT IMAGING | Age: 66
Discharge: HOME | End: 2021-02-01
Payer: SELF-PAY

## 2021-02-01 ENCOUNTER — TELEPHONE (OUTPATIENT)
Dept: CARDIOLOGY CLINIC | Age: 66
End: 2021-02-01

## 2021-02-01 DIAGNOSIS — Z29.9 PREVENTIVE MEASURE: ICD-10-CM

## 2021-02-01 PROCEDURE — 75571 CT HRT W/O DYE W/CA TEST: CPT

## 2021-02-01 NOTE — TELEPHONE ENCOUNTER
Patient requesting to speak to you. States he has received his results from the ct scan this morning and it was an \"alarming report\". Would like to know if Dr. Yamini Vides has received the results and if he has any feedback.      Phone: [phone]

## 2021-02-01 NOTE — TELEPHONE ENCOUNTER
Aurora Cleary MD  You 20 minutes ago (3:14 PM)     Ca score noted    Not extremely high and not f immediate concern   Proceed with stress echo as planned and we will discuss   Aggressive reductions in risk factors and stress test results at planned follow up    Message text      TC to patient ID verified. Advised pt as above per Dr. Gertrudis Stafford. Patient understands, answered all further questions. Pt states his blood pressure has been better controlled since increasing his Norvasc. Will be in for stress test Friday.

## 2021-02-05 ENCOUNTER — ANCILLARY PROCEDURE (OUTPATIENT)
Dept: CARDIOLOGY CLINIC | Age: 66
End: 2021-02-05
Payer: COMMERCIAL

## 2021-02-05 VITALS
HEIGHT: 71 IN | SYSTOLIC BLOOD PRESSURE: 140 MMHG | DIASTOLIC BLOOD PRESSURE: 100 MMHG | BODY MASS INDEX: 27.02 KG/M2 | WEIGHT: 193 LBS

## 2021-02-05 DIAGNOSIS — R94.31 ABNORMAL EKG: ICD-10-CM

## 2021-02-05 LAB
STRESS ANGINA INDEX: 0
STRESS BASELINE DIAS BP: 100 MMHG
STRESS BASELINE HR: 72 BPM
STRESS BASELINE SYS BP: 140 MMHG
STRESS ESTIMATED WORKLOAD: 13.3 METS
STRESS EXERCISE DUR MIN: NORMAL MIN:SEC
STRESS O2 SAT PEAK: 97 %
STRESS PEAK DIAS BP: 84 MMHG
STRESS PEAK SYS BP: 220 MMHG
STRESS PERCENT HR ACHIEVED: 108 %
STRESS POST PEAK HR: 166 BPM
STRESS RATE PRESSURE PRODUCT: NORMAL BPM*MMHG
STRESS SR DUKE TREADMILL SCORE: 0
STRESS ST DEPRESSION: 2 MM
STRESS TARGET HR: 154 BPM

## 2021-02-05 PROCEDURE — 93351 STRESS TTE COMPLETE: CPT | Performed by: SPECIALIST

## 2021-02-09 ENCOUNTER — VIRTUAL VISIT (OUTPATIENT)
Dept: CARDIOLOGY CLINIC | Age: 66
End: 2021-02-09
Payer: COMMERCIAL

## 2021-02-09 DIAGNOSIS — I10 ESSENTIAL HYPERTENSION: Primary | ICD-10-CM

## 2021-02-09 DIAGNOSIS — E78.49 OTHER HYPERLIPIDEMIA: ICD-10-CM

## 2021-02-09 PROCEDURE — 99214 OFFICE O/P EST MOD 30 MIN: CPT | Performed by: SPECIALIST

## 2021-02-09 NOTE — PROGRESS NOTES
[facility] Telemedicine Encounter                                                         Pursuant to the emergency declaration under the [facility] waiver authority and the [employer] Act, this Virtual  Visit was conducted, with patient's consent, to reduce the patient's risk of exposure to COVID-19 and provide continuity of care for an established patient. Services were provided through a video synchronous discussion virtually to substitute for in-person clinic visit. Subjective    He feels fine no cp or sob reported he remains active        HPI  Very pleasant 76years old gentleman with apparently unremarkable past medical history. He only takes vitamin D. He does have borderline hyperlipidemia. He denies any history of hypertension or diabetes. He was seen here  for cardiac evaluation of  abnormal electrocardiogram  And also found to have htn     Past medical history: As above.     Past surgical history: None.     Social history: He does not smoke and drinks occasionally , he is a .     Family history: Both of his brothers had myocardial infarction and CAD in mid 46s. 02/05/21   ECHO STRESS 02/05/2021 2/5/2021    Narrative · Baseline ECG: Normal sinus rhythm, non-specific ST-T wave abnormalities,   LVH with repolarization abnormality (strain). · Moderate risk Duke treadmill score. · Inconclusive stress test.  · Negative stress echocardiogram for evidence of ischemia. Low risk study.   · Probable false positive ECG given HTN response to exercise and baseline   nstt  · HTN response to exercise (581 systolic)        Signed by: Kulwinder Mosquera MD     CT Results (most recent):  Results from Hospital Encounter encounter on 02/01/21   CT HEART W/O CONT WITH CALCIUM    Narrative EXAM: CT HEART W/O CONT WITH CALCIUM    INDICATION: . Encounter for prophylactic measures, unspecified    COMPARISON: None. TECHNIQUE: Unenhanced multislice helical CT of the heart was performed on a  64-slice multiple detector row CT system (GoingOnT). Quantitative coronary artery  CT calcium scoring was performed using Tribzi software. No intravenous contrast was  administered. CT dose reduction was achieved through use of a standardized  protocol tailored for this examination and automatic exposure control for dose  modulation. FINDINGS:  The coronary calcium in each vessel is as follows:    Left main coronary artery: 20  Left anterior descending coronary artery: 95  Left circumflex coronary artery: 0  Right coronary artery: 54  Posterior descending coronary artery: 51  Diagonal: 65  PLB: 7. Total calcium score: 292     Calcium score interpretation:  0-0 = No evidence of CAD  1-10 = Minimal evidence of CAD   = Mild evidence of CAD  101-400 = Moderate evidence of CAD  >400 = Extensive evidence of CAD    A score of 292 is at the 50th percentile rank. Therefore, 50% of the males aged  74-77 will have a higher calcium score. Chest CT findings:  No significant abnormality in the incidentally imaged lower  lungs. The entire lung fields are not imaged on this examination. No evidence of  mass or lymphadenopathy. The incidentally imaged portions of the upper abdominal  organs are unremarkable. Impression Total calcium score of 292. Moderate evidence of coronary artery disease. The  result should be discussed with the patient taking into account other risk  factors such as age, gender, family history, diabetes, smoking or high  cholesterol levels.                            Past Medical History:   Diagnosis Date    GERD (gastroesophageal reflux disease)     Hypercholesterolemia     Trauma mid 1990's    closed head trauma; no seizures/residua         Past Surgical History:   Procedure Laterality Date    ENDOSCOPY, COLON, DIAGNOSTIC  4/4/2004    Dr. Tu Overton         Social History Tobacco Use    Smoking status: Never Smoker    Smokeless tobacco: Never Used   Substance Use Topics    Alcohol use: Yes     Comment: socially- couple of glasses each night    Drug use: No             There were no vitals taken for this visit. Wt Readings from Last 3 Encounters:   02/05/21 193 lb (87.5 kg)   01/15/21 193 lb (87.5 kg)   11/30/20 192 lb 12.8 oz (87.5 kg)           Review of Symptoms  11 systems reviewed, negative other than as stated in the HPI    Physical Exam:    Due to this being a TeleHealth evaluation, many elements of the physical examination are unable to be assessed. General: Well developed, in no acute distress, cooperative and alert  HEENT: Pupils equal/round. No marked JVD visible on video. Respiratory: No audible wheezing, no signs of respiratory distress, lips non cyanotic  Extremities:  No edema  Neuro: A&Ox3, speech clear, no facial droop, answering questions appropriately  Skin: Skin color is normal. No rashes or lesions. Non diaphoretic on visible skin during exam          Current Outpatient Medications on File Prior to Visit   Medication Sig Dispense Refill    amLODIPine (NORVASC) 5 mg tablet Take 1 Tab by mouth daily. 90 Tab 1    ergocalciferol, vitamin D2, (VITAMIN D2 PO) Take  by mouth daily. No current facility-administered medications on file prior to visit.              Lab Results   Component Value Date/Time    Cholesterol, total 216 (H) 11/30/2020 12:00 AM    HDL Cholesterol 62 11/30/2020 12:00 AM    LDL, calculated 135 (H) 11/30/2020 12:00 AM    LDL, calculated 106 (H) 10/24/2019 10:40 AM    VLDL, calculated 19 11/30/2020 12:00 AM    VLDL, calculated 11 10/24/2019 10:40 AM    Triglyceride 108 11/30/2020 12:00 AM         Lab Results   Component Value Date/Time    Sodium 140 11/30/2020 12:00 AM    Potassium 4.4 11/30/2020 12:00 AM    Chloride 103 11/30/2020 12:00 AM    CO2 25 11/30/2020 12:00 AM    Glucose 88 11/30/2020 12:00 AM    BUN 18 11/30/2020 12:00 AM    Creatinine 1.18 11/30/2020 12:00 AM    BUN/Creatinine ratio 15 11/30/2020 12:00 AM    GFR est AA 74 11/30/2020 12:00 AM    GFR est non-AA 64 11/30/2020 12:00 AM    Calcium 9.5 11/30/2020 12:00 AM         Lab Results   Component Value Date/Time    ALT (SGPT) 28 11/30/2020 12:00 AM    Alk. phosphatase 65 11/30/2020 12:00 AM    Bilirubin, total 0.6 11/30/2020 12:00 AM         Lab Results   Component Value Date/Time    WBC 6.4 11/30/2020 12:00 AM    HGB 15.5 11/30/2020 12:00 AM    HCT 45.2 11/30/2020 12:00 AM    PLATELET 650 13/60/8439 12:00 AM    MCV 88 11/30/2020 12:00 AM             Assessment  /Plan  :  1. Hypertension: The patient is start taking amlodipine 5 mg daily. No untoward effects thus far. I have reviewed with Tona his blood pressure logs and his blood pressure remains somewhat elevated between 140 and 160 mmHg. Discussed options. Increase Norvasc to 10 mg daily. Potential side effects of Norvasc increase have been discussed with the patient and he will let me know if they occur. He will do a blood pressure log again for the next 2 to 3 weeks and I will send it to me to determine if additional medications are needed to control his blood pressure. We have discussed the hypertensive response to exercise during stress echo. 2.  CAD: He does have subclinical CAD based on calcium score performed on February 2021 with a calcium score total of 292. Eats significance and implication of been discussed. Nonetheless this is stress echo failed to reveal any significant ischemia. We have discussed need for aggressive reduction risk factors. Control blood pressure and control hyperlipidemia. Exercise and nutrition discussed. I suspect the abnormal electrocardiogram given also the normal stress echocardiogram its related this point mostly to his hypertension. 3.  Hyperlipidemia:  In my opinion given abnormal calcium score is relatively strong family history for heart disease he would benefit long-term from statin therapy. I have discussed statin therapy side effects and rationale with the patient. He is agreeable to start. I will start him on Lipitor 10 mg daily. Liver function test and lipid level in 2 months. Otherwise I will see him back in approximately 10 weeks. We discussed the expected course, resolution and complications of the diagnosis(es) in detail. Medication risks, benefits, costs, interactions, and alternatives were discussed as indicated. I advised him   to contact the office if her condition worsens, changes or fails to improve as anticipated. he expressed understanding with the diagnosis(es) and plan        Tracie Flores MD      Greater than 20 minutes was spent in direct video patient care, planning and chart review. This visit was conducted using [employer] telemedicine services.

## 2021-02-10 DIAGNOSIS — E78.49 OTHER HYPERLIPIDEMIA: ICD-10-CM

## 2021-02-10 DIAGNOSIS — I10 ESSENTIAL HYPERTENSION: ICD-10-CM

## 2021-02-10 RX ORDER — ATORVASTATIN CALCIUM 10 MG/1
10 TABLET, FILM COATED ORAL DAILY
Qty: 90 TAB | Refills: 1 | Status: SHIPPED | OUTPATIENT
Start: 2021-02-10 | End: 2021-07-26

## 2021-02-10 RX ORDER — AMLODIPINE BESYLATE 10 MG/1
10 TABLET ORAL DAILY
Qty: 90 TAB | Refills: 1 | Status: SHIPPED | OUTPATIENT
Start: 2021-02-10 | End: 2021-04-30 | Stop reason: SDUPTHER

## 2021-02-10 NOTE — PATIENT INSTRUCTIONS
Increase Norvasc to 10mg daily. Please start taking Lipitor 10mg nightly. Please begin a blood pressure log. Include the date, time, your blood pressure and heart rate. Please take readings at different times each day. Keep this log for 2-3 week(s) and then report it to us. (MyChart is best for this) Remember to sit for at least 3 minutes. Have both feet flat on floor and arm at heart level when taking your blood pressure.     Follow up with Dr. Tavon Farrell in 10 weeks

## 2021-02-16 ENCOUNTER — TELEPHONE (OUTPATIENT)
Dept: PRIMARY CARE CLINIC | Age: 66
End: 2021-02-16

## 2021-02-16 NOTE — TELEPHONE ENCOUNTER
Spoke with patient's wife.  She figured out when and where it was done and is going to have information faxed over

## 2021-03-08 ENCOUNTER — OFFICE VISIT (OUTPATIENT)
Dept: INTERNAL MEDICINE CLINIC | Age: 66
End: 2021-03-08
Payer: COMMERCIAL

## 2021-03-08 VITALS
OXYGEN SATURATION: 97 % | DIASTOLIC BLOOD PRESSURE: 81 MMHG | RESPIRATION RATE: 12 BRPM | WEIGHT: 197 LBS | HEART RATE: 83 BPM | BODY MASS INDEX: 28.2 KG/M2 | TEMPERATURE: 98.2 F | HEIGHT: 70 IN | SYSTOLIC BLOOD PRESSURE: 155 MMHG

## 2021-03-08 DIAGNOSIS — E78.00 PURE HYPERCHOLESTEROLEMIA: ICD-10-CM

## 2021-03-08 DIAGNOSIS — I10 HYPERTENSION, ESSENTIAL: ICD-10-CM

## 2021-03-08 DIAGNOSIS — Z71.89 ADVANCED CARE PLANNING/COUNSELING DISCUSSION: ICD-10-CM

## 2021-03-08 DIAGNOSIS — Z00.00 ROUTINE PHYSICAL EXAMINATION: Primary | ICD-10-CM

## 2021-03-08 DIAGNOSIS — E66.3 OVERWEIGHT: ICD-10-CM

## 2021-03-08 PROBLEM — H90.6 MIXED CONDUCTIVE AND SENSORINEURAL HEARING LOSS OF BOTH EARS: Status: ACTIVE | Noted: 2021-03-08

## 2021-03-08 PROCEDURE — 99397 PER PM REEVAL EST PAT 65+ YR: CPT | Performed by: INTERNAL MEDICINE

## 2021-03-08 RX ORDER — SODIUM FLUORIDE 1.1 G/100G
GEL, DENTIFRICE ORAL
COMMUNITY
Start: 2020-12-14

## 2021-03-08 NOTE — ACP (ADVANCE CARE PLANNING)
Advance Care Planning     Advance Care Planning (ACP) Physician/NP/PA (Provider) Conversation    Date of ACP Conversation: 03/08/21  Persons included in Conversation:  patient  Length of ACP Conversation in minutes: <16 minutes (Non-Billable)    Authorized Decision Maker (if patient is incapable of making informed decisions):   Next of Kin by law (only applies in absence of a Healthcare Power of  or Legal Guardian)        He has NO advanced directive - recommended completing forms. Reviewed DNR/DNI and patient is not interested.          Roel West MD

## 2021-03-08 NOTE — PATIENT INSTRUCTIONS
Pneumococcal Polysaccharide Vaccine: What You Need to Know Why get vaccinated? Pneumococcal polysaccharide vaccine (PPSV23) can prevent pneumococcal disease. Pneumococcal disease refers to any illness caused by pneumococcal bacteria. These bacteria can cause many types of illnesses, including pneumonia, which is an infection of the lungs. Pneumococcal bacteria are one of the most common causes of pneumonia. Besides pneumonia, pneumococcal bacteria can also cause: 
· Ear infections, 
· Sinus infections · Meningitis (infection of the tissue covering the brain and spinal cord) · Bacteremia (bloodstream infection) Anyone can get pneumococcal disease, but children under 3years of age, people with certain medical conditions, adults 72 years or older, and cigarette smokers are at the highest risk. Most pneumococcal infections are mild. However, some can result in long-term problems, such as brain damage or hearing loss. Meningitis, bacteremia, and pneumonia caused by pneumococcal disease can be fatal. 
PPSV23 
PPSV23 protects against 23 types of bacteria that cause pneumococcal disease. PPSV23 is recommended for: · All adults 72 years or older, · Anyone 2 years or older with certain medical conditions that can lead to an increased risk for pneumococcal disease. Most people need only one dose of PPSV23. A second dose of PPSV23, and another type of pneumococcal vaccine called PCV13, are recommended for certain high-risk groups. Your health care provider can give you more information. People 65 years or older should get a dose of PPSV23 even if they have already gotten one or more doses of the vaccine before they turned 65. Talk with your health care provider Tell your vaccine provider if the person getting the vaccine: 
· Has had an allergic reaction after a previous dose of PPSV23, or has any severe, life-threatening allergies. In some cases, your health care provider may decide to postpone PPSV23 vaccination to a future visit. People with minor illnesses, such as a cold, may be vaccinated. People who are moderately or severely ill should usually wait until they recover before getting PPSV23. Your health care provider can give you more information. Risks of a vaccine reaction · Redness or pain where the shot is given, feeling tired, fever, or muscle aches can happen after PPSV23. People sometimes faint after medical procedures, including vaccination. Tell your provider if you feel dizzy or have vision changes or ringing in the ears. As with any medicine, there is a very remote chance of a vaccine causing a severe allergic reaction, other serious injury, or death. What if there is a serious problem? An allergic reaction could occur after the vaccinated person leaves the clinic. If you see signs of a severe allergic reaction (hives, swelling of the face and throat, difficulty breathing, a fast heartbeat, dizziness, or weakness), call 9-1-1 and get the person to the nearest hospital. 
For other signs that concern you, call your health care provider. Adverse reactions should be reported to the Vaccine Adverse Event Reporting System (VAERS). Your health care provider will usually file this report, or you can do it yourself. Visit the VAERS website at www.vaers. hhs.gov at www.vaers. hhs.gov or call 1-698.300.9273. VAERS is only for reporting reactions, and VAERS staff do not give medical advice. How can I learn more? · Ask your health care provider. · Call your local or state health department. · Contact the Centers for Disease Control and Prevention (CDC): 
? Call 3-818.892.1773 (1-800-CDC-INFO) or 
? Visit CDC's website at www.cdc.gov/vaccines Vaccine Information Statement PPSV23 Vaccine 10/30/2019 Department of Health and [employer] Centers for Disease Control and Prevention Many Vaccine Information Statements are available in [place]gr[place] and other languages. See www.immunize.org/vis. Hojas de información Sobre Vacunas están disponibles en español y en muchos otros idiomas. Visite Bruno.si. Care instructions adapted under license by [employer] (which disclaims liability or warranty for this information). If you have questions about a medical condition or this instruction, always ask your healthcare professional. [address] any warranty or liability for your use of this information.

## 2021-03-08 NOTE — PROGRESS NOTES
Anel Shaw is a 76 y.o. male who was seen in clinic today (3/8/2021) for a full physical.  He is a new patient. Previously followed at  and [facility]. Assessment & Plan:   1. Routine physical examination  2. Advanced care planning/counseling discussion  -     FULL CODE  3. Hypertension, essential  Assessment & Plan:  New dx to me, chronic problem, uncontrolled, actively working w/ cardiologist, will defer to him  4. Pure hypercholesterolemia  Assessment & Plan:  New dx to me, chronic problem, just started medications, will defer to cardiologist  5. Overweight  Comments:  new dx to me, reviewed diet & exercise      Follow-up and Dispositions    · Return in about 1 year (around 3/8/2022) for FULL PHYSICAL - 30 minutes. Subjective:   Leonel Aldana is here today for a full physical.      Routine Physical Exam      End of Life Planning: This was discussed with him today and he has NO advanced directive  - add't info provided. Reviewed DNR/DNI and patient is not interested. Depression Screen:  3 most recent PHQ Screens 3/8/2021   Little interest or pleasure in doing things Not at all   Feeling down, depressed, irritable, or hopeless Not at all   Total Score PHQ 2 0         Fall Risk:   Fall Risk Assessment, last 12 mths 3/8/2021   Able to walk? Yes   Fall in past 12 months? 0   Do you feel unsteady? 0   Are you worried about falling 0       Abuse Screen:  Abuse Screening Questionnaire 3/8/2021   Do you ever feel afraid of your partner? N   Are you in a relationship with someone who physically or mentally threatens you? N   Is it safe for you to go home?  Y         Do you average more than 1 drink per night or more than 7 drinks a week: Yes    In the past three months have you have had more than 4 drinks containing alcohol on one occasion: No    Health Maintenance:   Daily Aspirin: no  AAA Screening: NO    Immunizations:    Influenza: up to date   Tetanus: up to date   Shingles: up to date   Pneumonia: he thinks he had this done, records requested ([place])  Cancer screening:   Prostate: guidelines reviewed, up to date  Colon: guidelines reviewed, he reports UTD, records requested        Patient Care Team:  Cecilia Calles MD as PCP - General (Internal Medicine)  Cecilia Calles MD as PCP - [facility]  Claudell Bender, MD (Cardiology)       The following sections were reviewed & updated as appropriate: Problem List, Allergies, PMH, PSH, FH, and SH. Prior to Admission medications    Medication Sig Start Date End Date Taking? Authorizing Provider   Denta 5000 Plus 1.1 % crea USE EVERY DAY IN PLACE OF REGULAR TOOTHPASTE 12/14/20  Yes Provider, Historical   amLODIPine (NORVASC) 10 mg tablet Take 1 Tab by mouth daily. 2/10/21  Yes Claudell Bender, MD   atorvastatin (LIPITOR) 10 mg tablet Take 1 Tab by mouth daily. 2/10/21  Yes Claudell Bender, MD   ergocalciferol, vitamin D2, (VITAMIN D2 PO) Take  by mouth daily. Yes Provider, Historical          Review of Systems   Constitutional: Negative for chills and fever. Plans on going back to [facility]. Just received 2nd COVID vaccine   Respiratory: Negative for cough and shortness of breath. Cardiovascular: Negative for chest pain and palpitations. Gastrointestinal: Negative for abdominal pain, blood in stool, constipation, diarrhea, heartburn, nausea and vomiting. Genitourinary:        He denies: nocturia, urinary hesitancy, urinary frequency, weak stream.       Denies trouble getting or maintaining an erection. Denies trouble with AM erections. Musculoskeletal: Negative for joint pain and myalgias. Neurological: Negative for tingling, focal weakness and headaches. Endo/Heme/Allergies: Does not bruise/bleed easily. Psychiatric/Behavioral: Negative for depression. The patient is not nervous/anxious and does not have insomnia.           Objective:   Physical Exam  Constitutional:       General: He is not in acute distress. Appearance: He is well-developed. HENT:      Right Ear: Tympanic membrane and ear canal normal.      Left Ear: Tympanic membrane normal.      Ears:      Comments: Wearing hearing aides bilaterally  Eyes:      Conjunctiva/sclera: Conjunctivae normal.   Neck:      Thyroid: No thyromegaly. Cardiovascular:      Rate and Rhythm: Regular rhythm. Heart sounds: Normal heart sounds. No murmur. Pulmonary:      Effort: Pulmonary effort is normal.      Breath sounds: Normal breath sounds. Abdominal:      General: Bowel sounds are normal.      Palpations: Abdomen is soft. Tenderness: There is no abdominal tenderness. Musculoskeletal:      Right lower leg: No edema. Left lower leg: No edema. Lymphadenopathy:      Cervical: No cervical adenopathy.    Psychiatric:         Mood and Affect: Mood and affect normal.            Visit Vitals  BP (!) 155/81   Pulse 83   Temp 98.2 °F (36.8 °C) (Temporal)   Resp 12   Ht 5' 10.2\" (1.783 m)   Wt 197 lb (89.4 kg)   SpO2 97%   BMI 28.11 kg/m²         Vi Dickson MD

## 2021-04-16 LAB
ALBUMIN SERPL-MCNC: 4.6 G/DL (ref 3.8–4.8)
ALP SERPL-CCNC: 65 IU/L (ref 39–117)
ALT SERPL-CCNC: 28 IU/L (ref 0–44)
AST SERPL-CCNC: 22 IU/L (ref 0–40)
BILIRUB DIRECT SERPL-MCNC: 0.15 MG/DL (ref 0–0.4)
BILIRUB SERPL-MCNC: 0.6 MG/DL (ref 0–1.2)
CHOLEST SERPL-MCNC: 170 MG/DL (ref 100–199)
HDLC SERPL-MCNC: 77 MG/DL
IMP & REVIEW OF LAB RESULTS: NORMAL
LDLC SERPL CALC-MCNC: 79 MG/DL (ref 0–99)
PROT SERPL-MCNC: 7.1 G/DL (ref 6–8.5)
TRIGL SERPL-MCNC: 73 MG/DL (ref 0–149)
VLDLC SERPL CALC-MCNC: 14 MG/DL (ref 5–40)

## 2021-04-22 ENCOUNTER — OFFICE VISIT (OUTPATIENT)
Dept: CARDIOLOGY CLINIC | Age: 66
End: 2021-04-22
Payer: COMMERCIAL

## 2021-04-22 VITALS
OXYGEN SATURATION: 98 % | BODY MASS INDEX: 28.2 KG/M2 | WEIGHT: 197 LBS | HEART RATE: 63 BPM | SYSTOLIC BLOOD PRESSURE: 150 MMHG | HEIGHT: 70 IN | DIASTOLIC BLOOD PRESSURE: 90 MMHG | RESPIRATION RATE: 18 BRPM

## 2021-04-22 DIAGNOSIS — R94.31 ABNORMAL EKG: Primary | ICD-10-CM

## 2021-04-22 PROCEDURE — 99214 OFFICE O/P EST MOD 30 MIN: CPT | Performed by: SPECIALIST

## 2021-04-22 PROCEDURE — 93000 ELECTROCARDIOGRAM COMPLETE: CPT | Performed by: SPECIALIST

## 2021-04-22 NOTE — PROGRESS NOTES
[facility]                                                            OFFICE NOTE        Vandana Galan M.D.,FABRICIO Mamta Ludwigfisher   1955  [id number]    Date/Time:  4/22/20218:57 AM        ICD-10-CM ICD-9-CM    1. Abnormal EKG  R94.31 794.31 AMB POC EKG ROUTINE W/ 12 LEADS, INTER & REP         SUBJECTIVE:         Assessment/Plan     1. Hypertension: The patient is tolerating the increased dose of Norvasc well with no side effects. I have reviewed the log of his blood pressure in the last 2 months. His blood pressure is actually but better control at home and the majority of the readings is below 354 systolic and below 85 diastolic. He is nonetheless borderline for an additional medication. At this point I would have to her to add a second medication but we have discussed the issue and decided to wait. We will start exercising daily in an lose 5 to 10 pounds before the next time around. If in 4 months and the blood pressure remains borderline then I will add a second antihypertensive agent. 2.  CAD: He does have subclinical CAD based on calcium score performed on February 2021 with a calcium score total of 292.     Its significance and implication have been discussed.     Nonetheless this is stress echo failed to reveal any significant ischemia.     We have discussed need for aggressive reduction risk factors.     Control blood pressure and control hyperlipidemia. Exercise and nutrition discussed.     I suspect the abnormal electrocardiogram given also the normal stress echocardiogram its related this point mostly to his hypertension.     3. Hyperlipidemia: Doing well with additional Lipitor. We have discussed the lipid panel. This has much improved but not optimal yet.   Nonetheless I will now hold off increasing the Lipitor since the patient is planning to undergo go a significant exercise and weight loss attempt. Otherwise I will see him back in 4 months with a log with blood pressure 2 weeks prior      HPI     Very pleasant 76years old gentleman with apparently unremarkable past medical history.  He only takes vitamin Cherry Se does have borderline hyperlipidemia.  He denies any history of hypertension or diabetes. He was seen here  for cardiac evaluation of  abnormal electrocardiogram  And also found to have htn     Past medical history: As above.     Past surgical history: None.     Social history: He does not smoke and drinks occasionally , he is a .     Family history: Both of his brothers had myocardial infarction and CAD in mid 46s. CARDIAC STUDIES        02/05/21   ECHO STRESS 02/05/2021 2/12/2021    Narrative · Baseline ECG: Normal sinus rhythm, non-specific ST-T wave abnormalities,   LVH with repolarization abnormality (strain). · Moderate risk Duke treadmill score. · Inconclusive stress test.  · Negative stress echocardiogram for evidence of ischemia. Low risk study. · Probable false positive ECG given HTN response to exercise and baseline   nstt  · HTN response to exercise (323 systolic)        Signed by: Agapito Haynes MD                                 EKG Results     Procedure 720 Value Units Date/Time    AMB POC EKG ROUTINE W/ 12 LEADS, INTER & REP [[id number]] Resulted: 04/22/21 0827    Order Status: Completed Updated: 04/22/21 0830              IMAGING      MRI Results (most recent):  No results found for this or any previous visit. CT Results (most recent):  Results from Hospital Encounter encounter on 02/01/21   CT HEART W/O CONT WITH CALCIUM    Narrative EXAM: CT HEART W/O CONT WITH CALCIUM    INDICATION: . Encounter for prophylactic measures, unspecified    COMPARISON: None. TECHNIQUE: Unenhanced multislice helical CT of the heart was performed on a  64-slice multiple detector row CT system (GoMoreT).  Quantitative coronary artery  CT calcium scoring was performed using Lagou software. No intravenous contrast was  administered. CT dose reduction was achieved through use of a standardized  protocol tailored for this examination and automatic exposure control for dose  modulation. FINDINGS:  The coronary calcium in each vessel is as follows:    Left main coronary artery: 20  Left anterior descending coronary artery: 95  Left circumflex coronary artery: 0  Right coronary artery: 54  Posterior descending coronary artery: 51  Diagonal: 65  PLB: 7. Total calcium score: 292     Calcium score interpretation:  0-0 = No evidence of CAD  1-10 = Minimal evidence of CAD   = Mild evidence of CAD  101-400 = Moderate evidence of CAD  >400 = Extensive evidence of CAD    A score of 292 is at the 50th percentile rank. Therefore, 50% of the males aged  74-77 will have a higher calcium score. Chest CT findings:  No significant abnormality in the incidentally imaged lower  lungs. The entire lung fields are not imaged on this examination. No evidence of  mass or lymphadenopathy. The incidentally imaged portions of the upper abdominal  organs are unremarkable. Impression Total calcium score of 292. Moderate evidence of coronary artery disease. The  result should be discussed with the patient taking into account other risk  factors such as age, gender, family history, diabetes, smoking or high  cholesterol levels.        XR Results (most recent):          Past Medical History:   Diagnosis Date    GERD (gastroesophageal reflux disease)     Hypercholesterolemia     Hypertension, essential 3/8/2021    Mixed conductive and sensorineural hearing loss of both ears 3/8/2021    Pure hypercholesterolemia 3/8/2021    Trauma mid 1990's    closed head trauma; no seizures/residua     Past Surgical History:   Procedure Laterality Date    HX COLONOSCOPY  4/4/2004    Dr. Guanaco Aggarwal HX COLONOSCOPY  12/10/2013    Normal     Social History Tobacco Use    Smoking status: Never Smoker    Smokeless tobacco: Never Used   Substance Use Topics    Alcohol use: Yes     Alcohol/week: 14.0 standard drinks     Types: 14 Glasses of wine per week     Frequency: 4 or more times a week     Drinks per session: 1 or 2     Binge frequency: Never    Drug use: No     Family History   Problem Relation Age of Onset    Alcohol abuse Father     Hypertension Brother     Coronary Artery Disease Brother 61        CABG @ 62    Hypertension Brother     Coronary Artery Disease Brother 61    Heart Disease Maternal Aunt     Hypertension Son      No Known Allergies      Visit Vitals  BP (!) 150/90 (BP 1 Location: Right arm, BP Patient Position: Sitting, BP Cuff Size: Adult)   Pulse 63   Resp 18   Ht 5' 10\" (1.778 m)   Wt 197 lb (89.4 kg)   SpO2 98%   BMI 28.27 kg/m²         Last 3 Recorded Weights in this Encounter    04/22/21 0821   Weight: 197 lb (89.4 kg)            Review of Systems:   Pertinent items are noted in the History of Present Illness. Visit Vitals  BP (!) 150/90 (BP 1 Location: Right arm, BP Patient Position: Sitting, BP Cuff Size: Adult)   Pulse 63   Resp 18   Ht 5' 10\" (1.778 m)   Wt 197 lb (89.4 kg)   SpO2 98%   BMI 28.27 kg/m²     General Appearance:  Well developed, well nourished,alert and oriented x 3, and individual in no acute distress. Ears/Nose/Mouth/Throat:   Hearing grossly normal.         Neck: Supple. Chest:   Lungs clear to auscultation bilaterally. Cardiovascular:  Regular rate and rhythm, S1, S2 normal, no murmur. Abdomen:   Soft, non-tender, bowel sounds are active. Extremities: No edema bilaterally. Skin: Warm and dry. Current Outpatient Medications on File Prior to Visit   Medication Sig Dispense Refill    Denta 5000 Plus 1.1 % crea USE EVERY DAY IN PLACE OF REGULAR TOOTHPASTE      amLODIPine (NORVASC) 10 mg tablet Take 1 Tab by mouth daily.  90 Tab 1    atorvastatin (LIPITOR) 10 mg tablet Take 1 Tab by mouth daily. 90 Tab 1    ergocalciferol, vitamin D2, (VITAMIN D2 PO) Take  by mouth daily. No current facility-administered medications on file prior to visit. Anibal Varner had no medications administered during this visit. Current Outpatient Medications   Medication Sig    Denta 5000 Plus 1.1 % crea USE EVERY DAY IN PLACE OF REGULAR TOOTHPASTE    amLODIPine (NORVASC) 10 mg tablet Take 1 Tab by mouth daily.  atorvastatin (LIPITOR) 10 mg tablet Take 1 Tab by mouth daily.  ergocalciferol, vitamin D2, (VITAMIN D2 PO) Take  by mouth daily. No current facility-administered medications for this visit. Lab Results   Component Value Date/Time    Cholesterol, total 170 04/15/2021 07:55 AM    HDL Cholesterol 77 04/15/2021 07:55 AM    LDL, calculated 79 04/15/2021 07:55 AM    LDL, calculated 106 (H) 10/24/2019 10:40 AM    VLDL, calculated 14 04/15/2021 07:55 AM    VLDL, calculated 11 10/24/2019 10:40 AM    Triglyceride 73 04/15/2021 07:55 AM       Lab Results   Component Value Date/Time    Sodium 140 11/30/2020 12:00 AM    Potassium 4.4 11/30/2020 12:00 AM    Chloride 103 11/30/2020 12:00 AM    CO2 25 11/30/2020 12:00 AM    Glucose 88 11/30/2020 12:00 AM    BUN 18 11/30/2020 12:00 AM    Creatinine 1.18 11/30/2020 12:00 AM    BUN/Creatinine ratio 15 11/30/2020 12:00 AM    GFR est AA 74 11/30/2020 12:00 AM    GFR est non-AA 64 11/30/2020 12:00 AM    Calcium 9.5 11/30/2020 12:00 AM       Lab Results   Component Value Date/Time    ALT (SGPT) 28 04/15/2021 07:55 AM    Alk.  phosphatase 65 04/15/2021 07:55 AM    Bilirubin, direct 0.15 04/15/2021 07:55 AM    Bilirubin, total 0.6 04/15/2021 07:55 AM       Lab Results   Component Value Date/Time    WBC 6.4 11/30/2020 12:00 AM    HGB 15.5 11/30/2020 12:00 AM    HCT 45.2 11/30/2020 12:00 AM    PLATELET 204 34/46/1678 12:00 AM    MCV 88 11/30/2020 12:00 AM       Lab Results   Component Value Date/Time    TSH 1.040 10/24/2019 10:40 AM Lab Results   Component Value Date/Time    Cholesterol, total 170 04/15/2021 07:55 AM    Cholesterol, total 216 (H) 11/30/2020 12:00 AM    Cholesterol, total 193 10/24/2019 10:40 AM    Cholesterol, total 223 (H) 11/15/2018 08:05 AM    Cholesterol, total 193 11/09/2017 12:00 AM    HDL Cholesterol 77 04/15/2021 07:55 AM    HDL Cholesterol 62 11/30/2020 12:00 AM    HDL Cholesterol 76 10/24/2019 10:40 AM    HDL Cholesterol 67 11/15/2018 08:05 AM    HDL Cholesterol 63 11/09/2017 12:00 AM    LDL, calculated 79 04/15/2021 07:55 AM    LDL, calculated 135 (H) 11/30/2020 12:00 AM    LDL, calculated 106 (H) 10/24/2019 10:40 AM    LDL, calculated 132 (H) 11/15/2018 08:05 AM    LDL, calculated 112 (H) 11/09/2017 12:00 AM    LDL, calculated 113 (H) 01/25/2011 12:00 AM    Triglyceride 73 04/15/2021 07:55 AM    Triglyceride 108 11/30/2020 12:00 AM    Triglyceride 56 10/24/2019 10:40 AM    Triglyceride 119 11/15/2018 08:05 AM    Triglyceride 90 11/09/2017 12:00 AM                Please note that this dictation was completed with [employer], the computer voice recognition software. Quite often unanticipated grammatical, syntax, homophones, and other interpretative errors are inadvertently transcribed by the computer software. Please disregard these errors. Please excuse any errors that have escaped final proofreading.

## 2021-04-22 NOTE — PROGRESS NOTES
.  Visit Vitals  BP (!) 150/90 (BP 1 Location: Right arm, BP Patient Position: Sitting, BP Cuff Size: Adult)   Pulse 63   Resp 18   Ht 5' 10\" (1.778 m)   Wt 197 lb (89.4 kg)   SpO2 98%   BMI 28.27 kg/m²

## 2021-04-30 ENCOUNTER — TELEPHONE (OUTPATIENT)
Dept: CARDIOLOGY CLINIC | Age: 66
End: 2021-04-30

## 2021-04-30 RX ORDER — AMLODIPINE BESYLATE 10 MG/1
10 TABLET ORAL DAILY
Qty: 90 TAB | Refills: 1 | Status: SHIPPED | OUTPATIENT
Start: 2021-04-30 | End: 2021-07-29 | Stop reason: SDUPTHER

## 2021-04-30 NOTE — TELEPHONE ENCOUNTER
TC from pt, ID verified. Pt states he got Amlodipine 2.5mg from [employer] when he went to get his medications. Advised it was sent as 10mg the last time we sent it, but I will send again. Pt understands. No further questions.

## 2021-04-30 NOTE — TELEPHONE ENCOUNTER
Patient is requesting a call from Dr. Taran Rasheed nurse. No further details were give. Please advise.     Phone: [phone]

## 2021-07-26 RX ORDER — AMLODIPINE BESYLATE 2.5 MG/1
TABLET ORAL
Qty: 90 TABLET | Refills: 0 | Status: SHIPPED | OUTPATIENT
Start: 2021-07-26 | End: 2021-07-29 | Stop reason: DRUGHIGH

## 2021-07-26 RX ORDER — ATORVASTATIN CALCIUM 10 MG/1
TABLET, FILM COATED ORAL
Qty: 90 TABLET | Refills: 0 | Status: SHIPPED | OUTPATIENT
Start: 2021-07-26 | End: 2021-07-29 | Stop reason: SDUPTHER

## 2021-07-29 ENCOUNTER — TELEPHONE (OUTPATIENT)
Dept: CARDIOLOGY CLINIC | Age: 66
End: 2021-07-29

## 2021-07-29 RX ORDER — AMLODIPINE BESYLATE 10 MG/1
10 TABLET ORAL DAILY
Qty: 90 TABLET | Refills: 1 | Status: SHIPPED | OUTPATIENT
Start: 2021-07-29 | End: 2022-04-28

## 2021-07-29 RX ORDER — ATORVASTATIN CALCIUM 10 MG/1
TABLET, FILM COATED ORAL
Qty: 90 TABLET | Refills: 0 | Status: CANCELLED | OUTPATIENT
Start: 2021-07-29

## 2021-07-29 RX ORDER — ATORVASTATIN CALCIUM 10 MG/1
10 TABLET, FILM COATED ORAL DAILY
Qty: 90 TABLET | Refills: 1 | Status: SHIPPED | OUTPATIENT
Start: 2021-07-29 | End: 2022-02-09

## 2021-07-29 NOTE — TELEPHONE ENCOUNTER
Left message requesting a call back to reschedule upcoming appointment with Dr. Shanae Perry on 8/24 to next available.  Thanks

## 2021-07-29 NOTE — TELEPHONE ENCOUNTER
Cardiologist: Dr. Brandie Montoya    Last appt: 4/22/2021  Future Appointments   Date Time Provider Eileen Damon   9/10/2021  9:40 AM MD MELISSA Espinosa BS AMB   3/8/2022  7:30 AM [place] MD [facility] MATEUSZ ETIENNE AMB       Requested Prescriptions     Signed Prescriptions Disp Refills    amLODIPine (NORVASC) 10 mg tablet 90 Tablet 1     Sig: Take 1 Tablet by mouth daily. Authorizing Provider: Pat Calderon     Ordering User: ASH Mackey    atorvastatin (LIPITOR) 10 mg tablet 90 Tablet 1     Sig: Take 1 Tablet by mouth daily. Authorizing Provider: Pat Calderon     Ordering User: ASH RENO         Refills VO per Dr. Brandie Montoya.

## 2021-09-10 ENCOUNTER — OFFICE VISIT (OUTPATIENT)
Dept: CARDIOLOGY CLINIC | Age: 66
End: 2021-09-10
Payer: MEDICARE

## 2021-09-10 VITALS
HEIGHT: 70 IN | BODY MASS INDEX: 28.2 KG/M2 | OXYGEN SATURATION: 99 % | HEART RATE: 70 BPM | DIASTOLIC BLOOD PRESSURE: 94 MMHG | SYSTOLIC BLOOD PRESSURE: 154 MMHG | WEIGHT: 197 LBS

## 2021-09-10 DIAGNOSIS — R94.31 ABNORMAL EKG: Primary | ICD-10-CM

## 2021-09-10 DIAGNOSIS — E78.00 PURE HYPERCHOLESTEROLEMIA: ICD-10-CM

## 2021-09-10 PROCEDURE — 3017F COLORECTAL CA SCREEN DOC REV: CPT | Performed by: SPECIALIST

## 2021-09-10 PROCEDURE — G8432 DEP SCR NOT DOC, RNG: HCPCS | Performed by: SPECIALIST

## 2021-09-10 PROCEDURE — G8536 NO DOC ELDER MAL SCRN: HCPCS | Performed by: SPECIALIST

## 2021-09-10 PROCEDURE — G8427 DOCREV CUR MEDS BY ELIG CLIN: HCPCS | Performed by: SPECIALIST

## 2021-09-10 PROCEDURE — G8419 CALC BMI OUT NRM PARAM NOF/U: HCPCS | Performed by: SPECIALIST

## 2021-09-10 PROCEDURE — G8753 SYS BP > OR = 140: HCPCS | Performed by: SPECIALIST

## 2021-09-10 PROCEDURE — G8755 DIAS BP > OR = 90: HCPCS | Performed by: SPECIALIST

## 2021-09-10 PROCEDURE — 1101F PT FALLS ASSESS-DOCD LE1/YR: CPT | Performed by: SPECIALIST

## 2021-09-10 PROCEDURE — 99214 OFFICE O/P EST MOD 30 MIN: CPT | Performed by: SPECIALIST

## 2021-09-10 NOTE — PROGRESS NOTES
Noemí Shannon is a 78 y.o. male    Visit Vitals  BP (!) 154/94 (BP 1 Location: Left upper arm, BP Patient Position: Sitting, BP Cuff Size: Adult)   Pulse 70   Ht 5' 10\" (1.778 m)   Wt 197 lb (89.4 kg)   SpO2 99%   BMI 28.27 kg/m²       Chief Complaint   Patient presents with    Follow-up       Chest pain NO  SOB NO  Dizziness NO  Swelling NO  Recent hospital visit NO  Refills NO  COVID VACCINE STATUS YES

## 2021-09-10 NOTE — PATIENT INSTRUCTIONS
Please fax your blood pressure log to us at [phone]    Follow up with Dr. Clayton Alvarez as needed

## 2021-09-10 NOTE — PROGRESS NOTES
[facility]                                                            OFFICE NOTE        Keyona Head M.D.,HIPOLITO. Amanda Shape   1955  [id number]    Date/Time:  9/10/09926:51 AM            SUBJECTIVE:  He is doing well  No cp or sob or palpitations   exercising at [facility] with no issues    Wt Readings from Last 3 Encounters:   09/10/21 197 lb (89.4 kg)   04/22/21 197 lb (89.4 kg)   03/08/21 197 lb (89.4 kg)        Assessment/Plan  1.  Hypertension: The patient is tolerating the increased dose of Norvasc well with no side effects. He remains hypertensive here but he tells me it is normal at home   he really does not want to add a second medication    I gave asked hi to send me bp log for the next 2 weeks     If still elevated I would recommend starting bystolic 2.5 mg daily     2.  CAD: He does have subclinical CAD based on calcium score performed on February 2021 with a calcium score total of 292.     Its significance and implication have been discussed.     Nonetheless this is stress echo failed to reveal any significant ischemia.     We have discussed need for aggressive reduction risk factors.     Control blood pressure and control hyperlipidemia.  Exercise and nutrition discussed.     ECG with NSR and RBBB discussed previously     no h/o MEHUL     3.  Hyperlipidemia: Doing well with additional Lipitor. We have discussed the lipid panel. This has much improved     Nonetheless I will now hold off increasing the Lipitor since the patient is planning to undergo go a significant exercise and weight loss attempt. Closely followed  By his pcp     Otherwise I will see him back on a prn base as per patient's request        HPI     Very pleasant 76years old gentleman with apparently unremarkable past medical history.  He only takes vitamin Celesta Pickup does have borderline hyperlipidemia. [facility] denies any history of hypertension or diabetes. He was seen here  for cardiac evaluation of  abnormal electrocardiogram  And also found to have htn     Past medical history: As above.     Past surgical history: None.     Social history: He does not smoke and drinks occasionally , he is a .     Family history: Both of his brothers had myocardial infarction and CAD in mid 46s. CARDIAC STUDIES        02/05/21    ECHO STRESS 02/05/2021 2/12/2021    Interpretation Summary  · Baseline ECG: Normal sinus rhythm, non-specific ST-T wave abnormalities, LVH with repolarization abnormality (strain). · Moderate risk Duke treadmill score. · Inconclusive stress test.  · Negative stress echocardiogram for evidence of ischemia. Low risk study. · Probable false positive ECG given HTN response to exercise and baseline nstt  · HTN response to exercise (517 systolic)    Signed by: Bill Lozano MD on 2/5/2021  4:46 PM                              EKG Results     None              IMAGING      MRI Results (most recent):  No results found for this or any previous visit. CT Results (most recent):  Results from Hospital Encounter encounter on 02/01/21    CT HEART W/O CONT WITH CALCIUM    Narrative  EXAM: CT HEART W/O CONT WITH CALCIUM    INDICATION: . Encounter for prophylactic measures, unspecified    COMPARISON: None. TECHNIQUE: Unenhanced multislice helical CT of the heart was performed on a  64-slice multiple detector row CT system (Ecoviate). Quantitative coronary artery  CT calcium scoring was performed using ClariFI software. No intravenous contrast was  administered. CT dose reduction was achieved through use of a standardized  protocol tailored for this examination and automatic exposure control for dose  modulation.     FINDINGS:  The coronary calcium in each vessel is as follows:    Left main coronary artery: 20  Left anterior descending coronary artery: 95  Left circumflex coronary artery: 0  Right coronary artery: 54  Posterior descending coronary artery: 51  Diagonal: 65  PLB: 7. Total calcium score: 292    Calcium score interpretation:  0-0 = No evidence of CAD  1-10 = Minimal evidence of CAD   = Mild evidence of CAD  101-400 = Moderate evidence of CAD  >400 = Extensive evidence of CAD    A score of 292 is at the 50th percentile rank. Therefore, 50% of the males aged  74-77 will have a higher calcium score. Chest CT findings:  No significant abnormality in the incidentally imaged lower  lungs. The entire lung fields are not imaged on this examination. No evidence of  mass or lymphadenopathy. The incidentally imaged portions of the upper abdominal  organs are unremarkable. Impression  Total calcium score of 292. Moderate evidence of coronary artery disease. The  result should be discussed with the patient taking into account other risk  factors such as age, gender, family history, diabetes, smoking or high  cholesterol levels. XR Results (most recent):          Past Medical History:   Diagnosis Date    GERD (gastroesophageal reflux disease)     Hypercholesterolemia     Hypertension, essential 3/8/2021    Mixed conductive and sensorineural hearing loss of both ears 3/8/2021    Pure hypercholesterolemia 3/8/2021    Trauma mid 1990's    closed head trauma; no seizures/residua     Past Surgical History:   Procedure Laterality Date    HX COLONOSCOPY  4/4/2004    Dr. Ken Guzman HX COLONOSCOPY  12/10/2013    Normal     Social History     Tobacco Use    Smoking status: Never Smoker    Smokeless tobacco: Never Used   Vaping Use    Vaping Use: Never used   Substance Use Topics    Alcohol use:  Yes     Alcohol/week: 14.0 standard drinks     Types: 14 Glasses of wine per week    Drug use: No     Family History   Problem Relation Age of Onset    Alcohol abuse Father     Hypertension Brother     Coronary Artery Disease Brother 61        CABG @ 62    Hypertension Brother     Coronary Artery Disease Brother 61    Heart Disease Maternal Aunt     Hypertension Son      No Known Allergies      Visit Vitals  BP (!) 154/94 (BP 1 Location: Left upper arm, BP Patient Position: Sitting, BP Cuff Size: Adult)   Pulse 70   Ht 5' 10\" (1.778 m)   Wt 197 lb (89.4 kg)   SpO2 99%   BMI 28.27 kg/m²         Last 3 Recorded Weights in this Encounter    09/10/21 0930   Weight: 197 lb (89.4 kg)            Review of Systems:   Pertinent items are noted in the History of Present Illness. Visit Vitals  BP (!) 154/94 (BP 1 Location: Left upper arm, BP Patient Position: Sitting, BP Cuff Size: Adult)   Pulse 70   Ht 5' 10\" (1.778 m)   Wt 197 lb (89.4 kg)   SpO2 99%   BMI 28.27 kg/m²     General Appearance:  Well developed, well nourished,alert and oriented x 3, and individual in no acute distress. Ears/Nose/Mouth/Throat:   Hearing grossly normal.         Neck: Supple. Chest:   Lungs clear to auscultation bilaterally. Cardiovascular:  Regular rate and rhythm, S1, S2 normal, no murmur. Abdomen:   Soft, non-tender, bowel sounds are active. Extremities: No edema bilaterally. Skin: Warm and dry. Current Outpatient Medications on File Prior to Visit   Medication Sig Dispense Refill    amLODIPine (NORVASC) 10 mg tablet Take 1 Tablet by mouth daily. 90 Tablet 1    atorvastatin (LIPITOR) 10 mg tablet Take 1 Tablet by mouth daily. 90 Tablet 1    Denta 5000 Plus 1.1 % crea USE EVERY DAY IN PLACE OF REGULAR TOOTHPASTE      ergocalciferol, vitamin D2, (VITAMIN D2 PO) Take  by mouth daily. No current facility-administered medications on file prior to visit. Anibal Toledo had no medications administered during this visit. Current Outpatient Medications   Medication Sig    amLODIPine (NORVASC) 10 mg tablet Take 1 Tablet by mouth daily.  atorvastatin (LIPITOR) 10 mg tablet Take 1 Tablet by mouth daily.     Denta 5000 Plus 1.1 % crea USE EVERY DAY IN PLACE OF REGULAR TOOTHPASTE    ergocalciferol, vitamin D2, (VITAMIN D2 PO) Take  by mouth daily. No current facility-administered medications for this visit. Lab Results   Component Value Date/Time    Cholesterol, total 170 04/15/2021 07:55 AM    HDL Cholesterol 77 04/15/2021 07:55 AM    LDL, calculated 79 04/15/2021 07:55 AM    LDL, calculated 106 (H) 10/24/2019 10:40 AM    VLDL, calculated 14 04/15/2021 07:55 AM    VLDL, calculated 11 10/24/2019 10:40 AM    Triglyceride 73 04/15/2021 07:55 AM       Lab Results   Component Value Date/Time    Sodium 140 11/30/2020 12:00 AM    Potassium 4.4 11/30/2020 12:00 AM    Chloride 103 11/30/2020 12:00 AM    CO2 25 11/30/2020 12:00 AM    Glucose 88 11/30/2020 12:00 AM    BUN 18 11/30/2020 12:00 AM    Creatinine 1.18 11/30/2020 12:00 AM    BUN/Creatinine ratio 15 11/30/2020 12:00 AM    GFR est AA 74 11/30/2020 12:00 AM    GFR est non-AA 64 11/30/2020 12:00 AM    Calcium 9.5 11/30/2020 12:00 AM       Lab Results   Component Value Date/Time    ALT (SGPT) 28 04/15/2021 07:55 AM    Alk.  phosphatase 65 04/15/2021 07:55 AM    Bilirubin, direct 0.15 04/15/2021 07:55 AM    Bilirubin, total 0.6 04/15/2021 07:55 AM       Lab Results   Component Value Date/Time    WBC 6.4 11/30/2020 12:00 AM    HGB 15.5 11/30/2020 12:00 AM    HCT 45.2 11/30/2020 12:00 AM    PLATELET 445 46/11/2280 12:00 AM    MCV 88 11/30/2020 12:00 AM       Lab Results   Component Value Date/Time    TSH 1.040 10/24/2019 10:40 AM         Lab Results   Component Value Date/Time    Cholesterol, total 170 04/15/2021 07:55 AM    Cholesterol, total 216 (H) 11/30/2020 12:00 AM    Cholesterol, total 193 10/24/2019 10:40 AM    Cholesterol, total 223 (H) 11/15/2018 08:05 AM    Cholesterol, total 193 11/09/2017 12:00 AM    HDL Cholesterol 77 04/15/2021 07:55 AM    HDL Cholesterol 62 11/30/2020 12:00 AM    HDL Cholesterol 76 10/24/2019 10:40 AM    HDL Cholesterol 67 11/15/2018 08:05 AM    HDL Cholesterol 63 11/09/2017 12:00 AM    LDL, calculated 79 04/15/2021 07:55 AM    LDL, calculated 135 (H) 11/30/2020 12:00 AM    LDL, calculated 106 (H) 10/24/2019 10:40 AM    LDL, calculated 132 (H) 11/15/2018 08:05 AM    LDL, calculated 112 (H) 11/09/2017 12:00 AM    LDL, calculated 113 (H) 01/25/2011 12:00 AM    Triglyceride 73 04/15/2021 07:55 AM    Triglyceride 108 11/30/2020 12:00 AM    Triglyceride 56 10/24/2019 10:40 AM    Triglyceride 119 11/15/2018 08:05 AM    Triglyceride 90 11/09/2017 12:00 AM                Please note that this dictation was completed with [employer], the computer voice recognition software. Quite often unanticipated grammatical, syntax, homophones, and other interpretative errors are inadvertently transcribed by the computer software. Please disregard these errors. Please excuse any errors that have escaped final proofreading.

## 2021-09-27 ENCOUNTER — TELEPHONE (OUTPATIENT)
Dept: CARDIOLOGY CLINIC | Age: 66
End: 2021-09-27

## 2021-09-28 RX ORDER — NEBIVOLOL 2.5 MG/1
2.5 TABLET ORAL DAILY
Qty: 90 TABLET | Refills: 1 | Status: SHIPPED | OUTPATIENT
Start: 2021-09-28 | End: 2021-11-09 | Stop reason: SINTOL

## 2021-09-28 NOTE — TELEPHONE ENCOUNTER
Stephy Stratton MD  You 2 hours ago (1:45 PM)   MG  Not perfectly controlled at all times    If he is agreeable start bystolic 2.5 mg daily and bp log x 2 weeks after    Message text      TC to pt, ID verified. Advised pt as above per Dr. Suly Beckham. Reviewed medication with patient and he is agreeable to starting. Will call if he has any problems with this medication, otherwise will send in 2 week BP log after starting.

## 2021-10-18 NOTE — TELEPHONE ENCOUNTER
Cyndie Lopez MD  You 3 days ago   MG  Cont rx normal bp    Message text      TC to pt, ID verified.  Advised as above per Dr. Gal Haynes

## 2021-11-01 ENCOUNTER — TELEPHONE (OUTPATIENT)
Dept: CARDIOLOGY CLINIC | Age: 66
End: 2021-11-01

## 2021-11-01 NOTE — TELEPHONE ENCOUNTER
TC to pt, ID verified. Pt states he would like to talk with Dr. Jacinto Parker about his medications. He did not want to disclose anything further and states he would prefer to discuss with Dr. Jacinto Parker. Advised I could set him up for a virtual visit next week, and discussed it would be while Dr. Jacinto Parker is covering [facility] so he would call between 1-3pm. Pt is agreeable to this and would prefer a phone call only. Appt made, no further questions.

## 2021-11-09 ENCOUNTER — VIRTUAL VISIT (OUTPATIENT)
Dept: CARDIOLOGY CLINIC | Age: 66
End: 2021-11-09
Payer: MEDICARE

## 2021-11-09 DIAGNOSIS — E78.00 PURE HYPERCHOLESTEROLEMIA: Primary | ICD-10-CM

## 2021-11-09 DIAGNOSIS — I10 ESSENTIAL HYPERTENSION: ICD-10-CM

## 2021-11-09 PROCEDURE — 99442 PR PHYS/QHP TELEPHONE EVALUATION 11-20 MIN: CPT | Performed by: SPECIALIST

## 2021-11-09 NOTE — PROGRESS NOTES
TELEPHONE VISIT DOCUMENTATION 11/9/2021   Livan Plum 1955  66 y.o.male evaluated via telephone on 11/9/2021 due to COVID 19 restrictions. Patient unable to participate in Virtual Visit with synchronous audio/visual technology. Subjective    No cp or sob or palpitations but occasionally dizzy and very tired with bystolic especially after exercise    HPI  Very pleasant 78years old gentleman with apparently unremarkable past medical history.  He only takes vitamin Henrry Mates does have borderline hyperlipidemia.  He denies any history of hypertension or diabetes. He was seen here  for cardiac evaluation of  abnormal electrocardiogram  And also found to have htn     Past medical history: As above.     Past surgical history: None.     Social history: He does not smoke and drinks occasionally , he is a .     Family history: Both of his brothers had myocardial infarction and CAD in mid 46s. 02/05/21    ECHO STRESS 02/05/2021 2/12/2021    Interpretation Summary  · Baseline ECG: Normal sinus rhythm, non-specific ST-T wave abnormalities, LVH with repolarization abnormality (strain). · Moderate risk Duke treadmill score. · Inconclusive stress test.  · Negative stress echocardiogram for evidence of ischemia. Low risk study.   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 Farrukh Guzman HX COLONOSCOPY  12/10/2013    Normal Social History     Socioeconomic History    Marital status:      Spouse name: Not on file    Number of children: Not on file    Years of education: Not on file    Highest education level: Not on file   Occupational History    Occupation: . Tobacco Use    Smoking status: Never Smoker    Smokeless tobacco: Never Used   Vaping Use    Vaping Use: Never used   Substance and Sexual Activity    Alcohol use: Yes     Alcohol/week: 14.0 standard drinks     Types: 14 Glasses of wine per week    Drug use: No    Sexual activity: Yes     Partners: Female     Birth control/protection: Surgical, None   Other Topics Concern    Not on file   Social History Narrative    Not on file     Social Determinants of Health     Financial Resource Strain:     Difficulty of Paying Living Expenses: Not on file   Food Insecurity:     Worried About Running Out of Food in the Last Year: Not on file    Khris of Food in the Last Year: Not on file   Transportation Needs:     Lack of Transportation (Medical): Not on file    Lack of Transportation (Non-Medical):  Not on file   Physical Activity:     Days of Exercise per Week: Not on file    Minutes of Exercise per Session: Not on file   Stress:     Feeling of Stress : Not on file   Social Connections:     Frequency of Communication with Friends and Family: Not on file    Frequency of Social Gatherings with Friends and Family: Not on file    Attends Congregational Services: Not on file    Active Member of Clubs or Organizations: Not on file    Attends Club or Organization Meetings: Not on file    Marital Status: Not on file   Intimate Partner Violence:     Fear of Current or Ex-Partner: Not on file    Emotionally Abused: Not on file    Physically Abused: Not on file    Sexually Abused: Not on file   Housing Stability:     Unable to Pay for Housing in the Last Year: Not on file    Number of [place] in the Last Year: Not on file    Unstable Housing in the Last Year: Not on file         Family History   Problem Relation Age of Onset    Alcohol abuse Father     Hypertension Brother     Coronary Art Dis Brother 61        CABG @ 62    Hypertension Brother     Coronary Art Dis Brother 61    Heart Disease Maternal Aunt     Hypertension Son            No diagnosis found. No specialty comments available. Current Outpatient Medications   Medication Sig    nebivoloL (BYSTOLIC) 2.5 mg tablet Take 1 Tablet by mouth daily.  amLODIPine (NORVASC) 10 mg tablet Take 1 Tablet by mouth daily.  atorvastatin (LIPITOR) 10 mg tablet Take 1 Tablet by mouth daily.  ergocalciferol, vitamin D2, (VITAMIN D2 PO) Take  by mouth daily.  Denta 5000 Plus 1.1 % crea USE EVERY DAY IN PLACE OF REGULAR TOOTHPASTE     No current facility-administered medications for this visit. No Known Allergies      There were no vitals taken for this visit. Wt Readings from Last 3 Encounters:   09/10/21 197 lb (89.4 kg)   04/22/21 197 lb (89.4 kg)   03/08/21 197 lb (89.4 kg)         Lab Results   Component Value Date/Time    Cholesterol, total 170 04/15/2021 07:55 AM    HDL Cholesterol 77 04/15/2021 07:55 AM    LDL, calculated 79 04/15/2021 07:55 AM    LDL, calculated 106 (H) 10/24/2019 10:40 AM    VLDL, calculated 14 04/15/2021 07:55 AM    VLDL, calculated 11 10/24/2019 10:40 AM    Triglyceride 73 04/15/2021 07:55 AM       Lab Results   Component Value Date/Time    ALT (SGPT) 28 04/15/2021 07:55 AM    Alk.  phosphatase 65 04/15/2021 07:55 AM    Bilirubin, direct 0.15 04/15/2021 07:55 AM    Bilirubin, total 0.6 04/15/2021 07:55 AM       Lab Results   Component Value Date/Time    Sodium 140 11/30/2020 12:00 AM    Potassium 4.4 11/30/2020 12:00 AM    Chloride 103 11/30/2020 12:00 AM    CO2 25 11/30/2020 12:00 AM    Glucose 88 11/30/2020 12:00 AM    BUN 18 11/30/2020 12:00 AM    Creatinine 1.18 11/30/2020 12:00 AM    BUN/Creatinine ratio 15 11/30/2020 12:00 AM    GFR est AA 74 11/30/2020 12:00 AM    GFR est non-AA 64 11/30/2020 12:00 AM    Calcium 9.5 11/30/2020 12:00 AM       Lab Results   Component Value Date/Time    WBC 6.4 11/30/2020 12:00 AM    HGB 15.5 11/30/2020 12:00 AM    HCT 45.2 11/30/2020 12:00 AM    PLATELET 652 71/72/1039 12:00 AM    MCV 88 11/30/2020 12:00 AM             Assessment  /Plan  :  1.  Hypertension: The patient is tolerating the increased dose of Norvasc well with no side effects but he seems to have some noticeable side effects with bystolic    He is current ly on a low dose therefore I will stop bystolic and follow bp log for 2 weeks     If still elevated we may have to add losartan instead    2.  CAD: He does have subclinical CAD based on calcium score performed on February 2021 with a calcium score total of 292.     Its significance and implication have been discussed.     Nonetheless this is stress echo failed to reveal any significant ischemia.     We have discussed need for aggressive reduction risk factors.     Control blood pressure and control hyperlipidemia.  Exercise and nutrition discussed.     ECG with NSR and RBBB discussed previously      no h/o MEHUL     3.  Hyperlipidemia: Doing well with additional Lipitor.    Closely followed  By his pcp     Otherwise I will see him back on a prn base as per patient's request          Consent:   Cole Alvarado and/or health care decision maker is aware that that  may receive a bill for this telephone service, depending on 's insurance coverage, and has provided verbal consent to proceed: Yes  Documentation:  I communicated with the patient and/or health care decision maker about as above  Details of this discussion including any medical advice provided: as above  I affirm this is a Patient Initiated Episode with an Established Patient who has not had a related appointment within my department in the past 7 days or scheduled within the next 24 hours.     Total Time: minutes: 11-20 minutes    Note: not billable if this call serves to triage the patient into an appointment for the relevant concern      TELEPHONE VISIT DOCUMENTATION    Pursuant to the emergency declaration under the [facility], [facility] waiver authority and the [employer] Act, this Telephone Visit was conducted, with patient's consent, to reduce the patient's risk of exposure to COVID-19 and provide continuity of care for an established patient. This is a Patient Initiated Episode with an Established Patient who has not had a related appointment within my department in the past 7 days or scheduled within the next 24 hours. We discussed the expected course, resolution and complications of the diagnosis(es) in detail. Medication risks, benefits, costs, interactions, and alternatives were discussed as indicated. I advised  to contact the office if 's condition worsens, changes or fails to improve as anticipated.   expressed understanding with the diagnosis(es) and plan      Adriana Calhoun MD    [address]  [address] , [address] [address],[address]  [facility], [facility]  [phone] / [phone] Fax

## 2021-11-23 ENCOUNTER — TELEPHONE (OUTPATIENT)
Dept: CARDIOLOGY CLINIC | Age: 66
End: 2021-11-23

## 2021-11-23 NOTE — TELEPHONE ENCOUNTER
TC to pt, ID verified. Advised pt we rec'd his BP log. Dr. Luke Ledbetter covering [facility] [place] but he will review next week and we will get back with him. No further questions.

## 2021-11-23 NOTE — TELEPHONE ENCOUNTER
Patient was calling because he faxed over some papers for Misael Murrell [phone].    [phone]

## 2021-12-09 RX ORDER — LOSARTAN POTASSIUM 25 MG/1
25 TABLET ORAL DAILY
Qty: 90 TABLET | Refills: 1 | Status: SHIPPED | OUTPATIENT
Start: 2021-12-09 | End: 2022-01-04 | Stop reason: SDUPTHER

## 2021-12-09 NOTE — TELEPHONE ENCOUNTER
iMchael Cox MD  You 5 minutes ago (9:05 AM)     MG    Add losartan 25 mg daily bp log x 2 weeks after    Message text      TC to pt, ID verified. Advised pt as above per Dr. Jonathan Sheth. Pt understands. Medication sent to preferred pharmacy. No further questions.

## 2021-12-28 ENCOUNTER — TELEPHONE (OUTPATIENT)
Dept: CARDIOLOGY CLINIC | Age: 66
End: 2021-12-28

## 2021-12-28 NOTE — TELEPHONE ENCOUNTER
Patient faxed over papers to the fax machine [phone] for the provider or nurse. He sent over his BP readings.     [phone]

## 2021-12-30 NOTE — TELEPHONE ENCOUNTER
LM for RC.  Need to advise per VO from Dr. Isaac Boss pt to increase Losartan to 50mg daily due to increased BP on log

## 2022-01-04 RX ORDER — LOSARTAN POTASSIUM 50 MG/1
50 TABLET ORAL DAILY
Qty: 90 TABLET | Refills: 1 | Status: SHIPPED | OUTPATIENT
Start: 2022-01-04 | End: 2022-04-12

## 2022-01-04 NOTE — TELEPHONE ENCOUNTER
TC to pt, ID verified. Advised pt per Dr. Mona Cuevas to increase Losartan to 50mg daily and keep BP log for 2 weeks and send in. Pt understands no further questions.

## 2022-02-03 NOTE — TELEPHONE ENCOUNTER
Cony Jacinto MD  You 3 hours ago (9:35 AM)     MG    Acceptable for now    Continue same meds   Low salt and some weight loss 5-10 lbs    Message text      TC to pt, ID verified. Advised pt as above per Dr. Marquis Gale. Pt is hoping to get cleared from a leg surgery soon and will be able to exercise soon to help with his weight and BP. No further questions.

## 2022-02-09 RX ORDER — ATORVASTATIN CALCIUM 10 MG/1
TABLET, FILM COATED ORAL
Qty: 90 TABLET | Refills: 1 | Status: SHIPPED | OUTPATIENT
Start: 2022-02-09 | End: 2022-05-20 | Stop reason: SDUPTHER

## 2022-02-09 NOTE — TELEPHONE ENCOUNTER
Cardiologist: Dr. Lobo Gupta    Last appt: 11/9/2021  Future Appointments   Date Time Provider Eileen Almontei   3/8/2022  7:30 AM Thea Dumont MD [facility] MATEUSZ ALLEN       Requested Prescriptions     Signed Prescriptions Disp Refills    atorvastatin (LIPITOR) 10 mg tablet 90 Tablet 1     Sig: TAKE 1 TABLET BY MOUTH EVERY DAY     Authorizing Provider: Eliecer Cavazos     Ordering User: ASH RENO         Refills VO per Dr. Lobo Gupta.

## 2022-03-08 ENCOUNTER — OFFICE VISIT (OUTPATIENT)
Dept: INTERNAL MEDICINE CLINIC | Age: 67
End: 2022-03-08
Payer: MEDICARE

## 2022-03-08 VITALS
BODY MASS INDEX: 28.58 KG/M2 | HEIGHT: 70 IN | TEMPERATURE: 97.8 F | DIASTOLIC BLOOD PRESSURE: 82 MMHG | RESPIRATION RATE: 16 BRPM | HEART RATE: 61 BPM | WEIGHT: 199.6 LBS | SYSTOLIC BLOOD PRESSURE: 134 MMHG | OXYGEN SATURATION: 98 %

## 2022-03-08 DIAGNOSIS — Z12.5 PROSTATE CANCER SCREENING: ICD-10-CM

## 2022-03-08 DIAGNOSIS — Z23 ENCOUNTER FOR IMMUNIZATION: ICD-10-CM

## 2022-03-08 DIAGNOSIS — Z71.89 ADVANCED CARE PLANNING/COUNSELING DISCUSSION: ICD-10-CM

## 2022-03-08 DIAGNOSIS — E78.00 PURE HYPERCHOLESTEROLEMIA: ICD-10-CM

## 2022-03-08 DIAGNOSIS — E55.9 VITAMIN D DEFICIENCY: ICD-10-CM

## 2022-03-08 DIAGNOSIS — Z00.00 MEDICARE ANNUAL WELLNESS VISIT, SUBSEQUENT: Primary | ICD-10-CM

## 2022-03-08 DIAGNOSIS — I10 HYPERTENSION, ESSENTIAL: ICD-10-CM

## 2022-03-08 LAB
25(OH)D3 SERPL-MCNC: 32.5 NG/ML (ref 30–100)
ALBUMIN SERPL-MCNC: 4 G/DL (ref 3.5–5)
ALBUMIN/GLOB SERPL: 1.3 {RATIO} (ref 1.1–2.2)
ALP SERPL-CCNC: 64 U/L (ref 45–117)
ALT SERPL-CCNC: 32 U/L (ref 12–78)
ANION GAP SERPL CALC-SCNC: 4 MMOL/L (ref 5–15)
AST SERPL-CCNC: 14 U/L (ref 15–37)
BILIRUB SERPL-MCNC: 0.6 MG/DL (ref 0.2–1)
BUN SERPL-MCNC: 28 MG/DL (ref 6–20)
BUN/CREAT SERPL: 24 (ref 12–20)
CALCIUM SERPL-MCNC: 9 MG/DL (ref 8.5–10.1)
CHLORIDE SERPL-SCNC: 105 MMOL/L (ref 97–108)
CHOLEST SERPL-MCNC: 148 MG/DL
CO2 SERPL-SCNC: 27 MMOL/L (ref 21–32)
CREAT SERPL-MCNC: 1.16 MG/DL (ref 0.7–1.3)
ERYTHROCYTE [DISTWIDTH] IN BLOOD BY AUTOMATED COUNT: 13.5 % (ref 11.5–14.5)
GLOBULIN SER CALC-MCNC: 3.2 G/DL (ref 2–4)
GLUCOSE SERPL-MCNC: 96 MG/DL (ref 65–100)
HCT VFR BLD AUTO: 43.2 % (ref 36.6–50.3)
HDLC SERPL-MCNC: 62 MG/DL
HDLC SERPL: 2.4 {RATIO} (ref 0–5)
HGB BLD-MCNC: 14.3 G/DL (ref 12.1–17)
LDLC SERPL CALC-MCNC: 67.8 MG/DL (ref 0–100)
MCH RBC QN AUTO: 29.1 PG (ref 26–34)
MCHC RBC AUTO-ENTMCNC: 33.1 G/DL (ref 30–36.5)
MCV RBC AUTO: 88 FL (ref 80–99)
NRBC # BLD: 0 K/UL (ref 0–0.01)
NRBC BLD-RTO: 0 PER 100 WBC
PLATELET # BLD AUTO: 299 K/UL (ref 150–400)
PMV BLD AUTO: 9.8 FL (ref 8.9–12.9)
POTASSIUM SERPL-SCNC: 4.3 MMOL/L (ref 3.5–5.1)
PROT SERPL-MCNC: 7.2 G/DL (ref 6.4–8.2)
PSA SERPL-MCNC: 0.7 NG/ML (ref 0.01–4)
RBC # BLD AUTO: 4.91 M/UL (ref 4.1–5.7)
SODIUM SERPL-SCNC: 136 MMOL/L (ref 136–145)
TRIGL SERPL-MCNC: 91 MG/DL (ref ?–150)
VLDLC SERPL CALC-MCNC: 18.2 MG/DL
WBC # BLD AUTO: 7 K/UL (ref 4.1–11.1)

## 2022-03-08 PROCEDURE — 1101F PT FALLS ASSESS-DOCD LE1/YR: CPT | Performed by: INTERNAL MEDICINE

## 2022-03-08 PROCEDURE — G0009 ADMIN PNEUMOCOCCAL VACCINE: HCPCS | Performed by: INTERNAL MEDICINE

## 2022-03-08 PROCEDURE — G8427 DOCREV CUR MEDS BY ELIG CLIN: HCPCS | Performed by: INTERNAL MEDICINE

## 2022-03-08 PROCEDURE — G8754 DIAS BP LESS 90: HCPCS | Performed by: INTERNAL MEDICINE

## 2022-03-08 PROCEDURE — G8419 CALC BMI OUT NRM PARAM NOF/U: HCPCS | Performed by: INTERNAL MEDICINE

## 2022-03-08 PROCEDURE — G8510 SCR DEP NEG, NO PLAN REQD: HCPCS | Performed by: INTERNAL MEDICINE

## 2022-03-08 PROCEDURE — 3017F COLORECTAL CA SCREEN DOC REV: CPT | Performed by: INTERNAL MEDICINE

## 2022-03-08 PROCEDURE — G8752 SYS BP LESS 140: HCPCS | Performed by: INTERNAL MEDICINE

## 2022-03-08 PROCEDURE — 90732 PPSV23 VACC 2 YRS+ SUBQ/IM: CPT | Performed by: INTERNAL MEDICINE

## 2022-03-08 PROCEDURE — G8536 NO DOC ELDER MAL SCRN: HCPCS | Performed by: INTERNAL MEDICINE

## 2022-03-08 PROCEDURE — G0439 PPPS, SUBSEQ VISIT: HCPCS | Performed by: INTERNAL MEDICINE

## 2022-03-08 NOTE — PROGRESS NOTES
Chief Complaint   Patient presents with   [facility] Wellness Visit     Reviewed record in preparation for visit and have obtained necessary documentation. Identified pt with two pt identifiers(name and ). Health Maintenance Due   Topic    Pneumococcal 65+ years (1 of 1 - PPSV23)    Medicare Yearly Exam          Chief Complaint   Patient presents with    Annual Wellness Visit        Wt Readings from Last 3 Encounters:   22 199 lb 9.6 oz (90.5 kg)   09/10/21 197 lb (89.4 kg)   21 197 lb (89.4 kg)     Temp Readings from Last 3 Encounters:   22 97.8 °F (36.6 °C) (Temporal)   21 98.2 °F (36.8 °C) (Temporal)   20 97.7 °F (36.5 °C) (Oral)     BP Readings from Last 3 Encounters:   22 (!) 145/78   09/10/21 (!) 154/94   21 (!) 150/90     Pulse Readings from Last 3 Encounters:   22 61   09/10/21 70   21 63           Learning Assessment:  :     Learning Assessment 3/8/2021 2020 2019 2018   PRIMARY LEARNER Patient Patient Patient Patient   HIGHEST LEVEL OF EDUCATION - PRIMARY LEARNER  GRADUATED HIGH SCHOOL OR GED GRADUATED HIGH SCHOOL OR GED GRADUATED HIGH SCHOOL OR GED GRADUATED HIGH SCHOOL OR GED   BARRIERS PRIMARY LEARNER NONE HEARING NONE NONE   CO-LEARNER CAREGIVER - No No -   PRIMARY LANGUAGE ENGLISH ENGLISH ENGLISH ENGLISH   LEARNER PREFERENCE PRIMARY VIDEOS READING DEMONSTRATION DEMONSTRATION   ANSWERED BY patient patient paitent patient   RELATIONSHIP SELF SELF SELF SELF       Depression Screening:  :     3 most recent PHQ Screens 3/8/2022   Little interest or pleasure in doing things Not at all   Feeling down, depressed, irritable, or hopeless Not at all   Total Score PHQ 2 0       Fall Risk Assessment:  :     Fall Risk Assessment, last 12 mths 3/8/2022   Able to walk? Yes   Fall in past 12 months? 0   Do you feel unsteady?  0   Are you worried about falling 0       Abuse Screening:  :     Abuse Screening Questionnaire 3/8/2022 3/8/2021 11/30/2020 10/24/2019 7/17/2019 11/20/2018   Do you ever feel afraid of your partner? N N N N N N   Are you in a relationship with someone who physically or mentally threatens you? N N N N N N   Is it safe for you to go home? Y Y Y Y Y Y       Coordination of Care Questionnaire:  :     1) Have you been to an emergency room, urgent care clinic since your last visit? no   Hospitalized since your last visit? no             2) Have you seen or consulted any other health care providers outside of [address] [place] [address] since your last visit? yes  (Include any pap smears or colon screenings in this section.)    3) Do you have an Advance Directive on file? no    4) Are you interested in receiving information on Advance Directives? NO      Patient is accompanied by self I have received verbal consent from Karol Murillo to discuss any/all medical information while they are present in the room. Reviewed record  In preparation for visit and have obtained necessary documentation.

## 2022-03-08 NOTE — ASSESSMENT & PLAN NOTE
Due for labs since starting medication, he is taking his medication(s) as directed & without any side effects, will check labs today

## 2022-03-08 NOTE — PROGRESS NOTES
Yaritza Fang is a 78 y.o. male who was seen in clinic today (3/8/2022) for a full physical.          Assessment & Plan:   Below is the assessment and plan developed based on review of pertinent history, physical exam, labs, studies, and medications. 1. Medicare annual wellness visit, subsequent  2. Advanced care planning/counseling discussion  3. Encounter for immunization  -     PNEUMOCOCCAL POLYSACCHARIDE VACCINE, 23-VALENT, ADULT OR IMMUNOSUPPRESSED PT DOSE,  4. Prostate cancer screening  -     PSA SCREENING (SCREENING); Future  5. Hypertension, essential  Assessment & Plan:  At goal, well controlled at home & he reports 120's/70's, he is taking his medication(s) as directed & without any side effects, no changes pending review of labs   Orders:  -     METABOLIC PANEL, COMPREHENSIVE; Future  -     CBC W/O DIFF; Future  6. Pure hypercholesterolemia  Assessment & Plan:  Due for labs since starting medication, he is taking his medication(s) as directed & without any side effects, will check labs today   Orders:  -     METABOLIC PANEL, COMPREHENSIVE; Future  -     LIPID PANEL; Future  7. Vitamin D deficiency  Assessment & Plan:  Asymptomatic, will check labs today, if stable will stop regularly screening, continue current supplement   Orders:  -     VITAMIN D, 25 HYDROXY; Future    Follow-up and Dispositions    · Return in about 1 year (around 3/8/2023) for FULL PHYSICAL - 30 minutes. Subjective:   Jessica Caba is here today for a full physical.      Annual Wellness Visit- Subsequent Visit    Since last visit: hsa seen cardiology, BP medications adjusted and cholesteorl medication started    End of Life Planning: This was discussed with him today and he has NO advanced directive  - add't info provided. Reviewed DNR/DNI and patient is not interested.       Depression Screen:  3 most recent PHQ Screens 3/8/2022   Little interest or pleasure in doing things Not at all   Feeling down, depressed, irritable, or hopeless Not at all   Total Score PHQ 2 0         Fall Risk:   Fall Risk Assessment, last 12 mths 3/8/2022   Able to walk? Yes   Fall in past 12 months? 0   Do you feel unsteady? 0   Are you worried about falling 0       Abuse Screen:  Abuse Screening Questionnaire 3/8/2022   Do you ever feel afraid of your partner? N   Are you in a relationship with someone who physically or mentally threatens you? N   Is it safe for you to go home? Y       Do you average more than 1 drink per night or more than 7 drinks a week: No    In the past three months have you have had more than 4 drinks containing alcohol on one occasion: No          Health Maintenance:   Daily Aspirin: no  AAA Screening: n/a: never smoked    Immunizations:   Covid: up to date   Influenza: up to date   Tetanus: up to date   Shingles: up to date   Pneumonia: will do today  Cancer screening:   Prostate: guidelines reviewed, will do today  Colon: guidelines reviewed, up to date    Functional Ability and Level of Safety:    Hearing: Hearing is good. The patient wears hearing aids. Cognition Screen:   Has your family/caregiver stated any concerns about your memory: no  Cognitive Screening: Normal - Clock Drawing Test     Ambulation: with no difficulty     Activities of Daily Living: The home contains: no safety equipment. Patient does total self care  Exercise: very active    Adult Nutrition Screen:  No risk factors noted. Patient Care Team:  Negar Zhao MD as PCP - General (Internal Medicine)  Negar Zhao MD as PCP - REHABILITATION [facility] Empaneled Provider  Shad Douglas MD (Cardiology)  Yunior Rojo MD (Inactive) (Gastroenterology)       The following sections were reviewed & updated as appropriate: Problem List, Allergies, PMH, PSH, FH, and SH. Prior to Admission medications    Medication Sig Start Date End Date Taking?  Authorizing Provider   atorvastatin (LIPITOR) 10 mg tablet TAKE 1 TABLET BY MOUTH EVERY DAY 2/9/22  Yes Ady Boaz Tellez MD   losartan (COZAAR) 50 mg tablet Take 1 Tablet by mouth daily. 1/4/22  Yes Juan Ramon Lopez MD   amLODIPine (NORVASC) 10 mg tablet Take 1 Tablet by mouth daily. 7/29/21  Yes Juan Ramon Lopez MD   Denta 5000 Plus 1.1 % crea USE EVERY DAY IN PLACE OF REGULAR TOOTHPASTE 12/14/20  Yes Provider, Historical   ergocalciferol, vitamin D2, (VITAMIN D2 PO) Take  by mouth daily. Yes Provider, Historical          Review of Systems   Constitutional: Negative for chills and fever. Respiratory: Negative for cough and shortness of breath. Cardiovascular: Negative for chest pain and palpitations. Gastrointestinal: Negative for abdominal pain, blood in stool, constipation, diarrhea, heartburn, nausea and vomiting. Genitourinary:        He denies: nocturia, urinary hesitancy, urinary frequency, weak stream.       Denies trouble getting or maintaining an erection. Denies trouble with AM erections. Musculoskeletal: Negative for joint pain and myalgias. Neurological: Negative for tingling, focal weakness and headaches. Endo/Heme/Allergies: Does not bruise/bleed easily. Psychiatric/Behavioral: Negative for depression. The patient is not nervous/anxious and does not have insomnia. Objective:   Physical Exam  Constitutional:       General: He is not in acute distress. Appearance: Normal appearance. Eyes:      Conjunctiva/sclera: Conjunctivae normal.   Cardiovascular:      Rate and Rhythm: Regular rhythm. Heart sounds: No murmur heard. Pulmonary:      Effort: Pulmonary effort is normal.      Breath sounds: Normal breath sounds. No decreased breath sounds or wheezing. Abdominal:      General: Bowel sounds are normal.      Palpations: Abdomen is soft. Tenderness: There is no abdominal tenderness. Musculoskeletal:      Right lower leg: No edema. Left lower leg: No edema.    Psychiatric:         Mood and Affect: Mood and affect normal.          Visit Vitals  /82   Pulse 61 Temp 97.8 °F (36.6 °C) (Temporal)   Resp 16   Ht 5' 10\" (1.778 m)   Wt 199 lb 9.6 oz (90.5 kg)   SpO2 98%   BMI 28.64 kg/m²         Andi Grant MD

## 2022-03-08 NOTE — ASSESSMENT & PLAN NOTE
Asymptomatic, will check labs today, if stable will stop regularly screening, continue current supplement

## 2022-03-08 NOTE — PATIENT INSTRUCTIONS

## 2022-03-08 NOTE — ASSESSMENT & PLAN NOTE
At goal, well controlled at home & he reports 120's/70's, he is taking his medication(s) as directed & without any side effects, no changes pending review of labs

## 2022-03-08 NOTE — ACP (ADVANCE CARE PLANNING)
Advance Care Planning   Advance Care Planning (ACP) Physician/NP/PA (Provider) Conversation    Date of ACP Conversation: 03/08/22  Persons included in Conversation:  patient  Length of ACP Conversation in minutes: <16 minutes (Non-Billable)    Authorized Decision Maker (if patient is incapable of making informed decisions):   Next of Kin by law (only applies in absence of a Healthcare Power of  or Legal Guardian)      Primary Decision Maker: [address] [place] [address] [phone]    He has NO advanced directive - recommended completing forms. Reviewed DNR/DNI and patient is not interested- elects Full Code (attempt resuscitation).        Zohra Walker MD

## 2022-03-18 PROBLEM — E78.00 PURE HYPERCHOLESTEROLEMIA: Status: ACTIVE | Noted: 2021-03-08

## 2022-03-19 PROBLEM — I10 HYPERTENSION, ESSENTIAL: Status: ACTIVE | Noted: 2021-03-08

## 2022-03-19 PROBLEM — H90.6 MIXED CONDUCTIVE AND SENSORINEURAL HEARING LOSS OF BOTH EARS: Status: ACTIVE | Noted: 2021-03-08

## 2022-03-20 PROBLEM — E55.9 VITAMIN D DEFICIENCY: Status: ACTIVE | Noted: 2022-03-08

## 2022-04-12 RX ORDER — LOSARTAN POTASSIUM 50 MG/1
TABLET ORAL
Qty: 90 TABLET | Refills: 1 | Status: SHIPPED | OUTPATIENT
Start: 2022-04-12 | End: 2022-05-20 | Stop reason: SDUPTHER

## 2022-04-12 NOTE — TELEPHONE ENCOUNTER
Cardiologist: Dr. Bennie Cuevas    Last appt: 11/9/2021  Future Appointments   Date Time Provider Eileen Marisol   3/14/2023  7:30 AM Karin Lozano MD [facility]       Requested Prescriptions     Signed Prescriptions Disp Refills    losartan (COZAAR) 50 mg tablet 90 Tablet 1     Sig: TAKE 1 TABLET BY MOUTH EVERY DAY     Authorizing Provider: Chantel Mendoza     Ordering User: ASH RENO         Refarpita VO per Dr. Avery Letters.
[[email or web address]_ALL_OB_FT:[id number]],[NS_DeliveryRN_OBGYN_ALL_OB_FT:[id number]]

## 2022-04-28 RX ORDER — AMLODIPINE BESYLATE 10 MG/1
TABLET ORAL
Qty: 90 TABLET | Refills: 1 | Status: SHIPPED | OUTPATIENT
Start: 2022-04-28 | End: 2022-05-20 | Stop reason: SDUPTHER

## 2022-04-28 NOTE — TELEPHONE ENCOUNTER
Cardiologist: Dr. Tina Garzon    Last appt: 11/9/2021  Future Appointments   Date Time Provider Eileen Damon   3/14/2023  7:30 AM Shanae Collins MD [facility]       Requested Prescriptions     Signed Prescriptions Disp Refills    amLODIPine (NORVASC) 10 mg tablet 90 Tablet 1     Sig: TAKE 1 TABLET BY MOUTH EVERY DAY     Authorizing Provider: Parris Dowd     Ordering User: ASH RENO         Refarpita VO per Dr. Tina Garzon.

## 2022-05-20 ENCOUNTER — TELEPHONE (OUTPATIENT)
Dept: CARDIOLOGY CLINIC | Age: 67
End: 2022-05-20

## 2022-05-20 RX ORDER — ATORVASTATIN CALCIUM 10 MG/1
10 TABLET, FILM COATED ORAL DAILY
Qty: 7 TABLET | Refills: 0 | Status: SHIPPED | OUTPATIENT
Start: 2022-05-20 | End: 2022-09-14

## 2022-05-20 RX ORDER — LOSARTAN POTASSIUM 50 MG/1
50 TABLET ORAL DAILY
Qty: 7 TABLET | Refills: 0 | Status: SHIPPED | OUTPATIENT
Start: 2022-05-20

## 2022-05-20 RX ORDER — AMLODIPINE BESYLATE 10 MG/1
10 TABLET ORAL DAILY
Qty: 7 TABLET | Refills: 0 | Status: SHIPPED | OUTPATIENT
Start: 2022-05-20 | End: 2022-11-01

## 2022-05-20 NOTE — TELEPHONE ENCOUNTER
Patient is calling to notify us that he is on a trip and left his medications at home. He would like to know if he missed 4 days of medication would he be okay . Please Advise.       [phone]

## 2022-05-20 NOTE — TELEPHONE ENCOUNTER
Patient is calling to give the pharmacy he would like his prescription called into:    [facility] pharmacy.    [address]

## 2022-05-20 NOTE — TELEPHONE ENCOUNTER
Short supply of medications sent to requested pharmacy. Cardiologist: Dr. Bennie Cuevas    Last appt: 11/9/2021  Future Appointments   Date Time Provider Eileen Almontei   3/14/2023  7:30 AM Karin Lozano MD [facility]       Requested Prescriptions     Signed Prescriptions Disp Refills    amLODIPine (NORVASC) 10 mg tablet 7 Tablet 0     Sig: Take 1 Tablet by mouth daily. Authorizing Provider: Chantel Mendoza     Ordering User: SAH Purdy    atorvastatin (LIPITOR) 10 mg tablet 7 Tablet 0     Sig: Take 1 Tablet by mouth daily. Authorizing Provider: Chantel Mendoza     Ordering User: ASH RENO    losartan (COZAAR) 50 mg tablet 7 Tablet 0     Sig: Take 1 Tablet by mouth daily. Authorizing Provider: Chantel Mendoza     Ordering User: ASH RENO         Refills VO per Dr. Bennie Cuevas.

## 2022-05-20 NOTE — TELEPHONE ENCOUNTER
TC to pt, ID verified. Pt states he is on his way out of town and forgot his medications. Advised we could send a short supply to a local pharmacy where he is staying. Pt will call back with the name of a pharmacy he wants us to send his medications to.

## 2022-06-05 ENCOUNTER — TELEPHONE (OUTPATIENT)
Dept: INTERNAL MEDICINE CLINIC | Age: 67
End: 2022-06-05

## 2022-06-05 RX ORDER — NIRMATRELVIR AND RITONAVIR 300-100 MG
KIT ORAL
Qty: 1 BOX | Refills: 0 | OUTPATIENT
Start: 2022-06-05

## 2022-06-05 NOTE — TELEPHONE ENCOUNTER
On call- Diagnosed with COVID by home testing. Symptoms began yesterday. She is high risk based on age, underlying medical conditions. I advised antiviral treatment. Reviewed side effects, goal of treatment and need for follow up . If receiving Paxlovid and on a statin will hold the latter for duration of therapy. ER red flags reviewed. follow up with PCP with any problems, further questions.

## 2022-06-06 ENCOUNTER — TELEPHONE (OUTPATIENT)
Dept: INTERNAL MEDICINE CLINIC | Age: 67
End: 2022-06-06

## 2022-06-06 NOTE — TELEPHONE ENCOUNTER
Reason for call:  Pt tested positive for COVID-19 on Saturday. He is wanting to speak with the doctor to follow up. He spoke with the on-call doctor about a prescription. And he wanted to speak with Dr. Nelda Valentine about this.     Is this a new problem: yes     Date of last appointment:  3/8/2022     Can we respond via Countdown: no    Best call back number: [phone]

## 2022-06-06 NOTE — TELEPHONE ENCOUNTER
Pt. Started symptoms thurs. With sniffles, fri. Got body aches, sat. Congestion tested positive covid Dr Yolanda Posada put him on paxclovid  and he's holding his statin until this thurs. Afebrile, feeling better each day, is staying home until thurs. And will wear a mask in public for another week. Only medication he took was nyquil qhs. Encouraged to call back if he worsens or has further questions.

## 2022-09-14 RX ORDER — ATORVASTATIN CALCIUM 10 MG/1
TABLET, FILM COATED ORAL
Qty: 90 TABLET | Refills: 1 | Status: SHIPPED | OUTPATIENT
Start: 2022-09-14

## 2022-09-14 NOTE — TELEPHONE ENCOUNTER
Cardiologist: Dr. Brad Fair    Last appt: 11/9/2021  Future Appointments   Date Time Provider Eileen Marisol   3/14/2023  7:30 AM Lori Wynne MD [facility]       Requested Prescriptions     Signed Prescriptions Disp Refills    atorvastatin (LIPITOR) 10 mg tablet 90 Tablet 1     Sig: TAKE 1 TABLET BY MOUTH EVERY DAY     Authorizing Provider: Alisa Deal     Ordering User: ASH RENO         Refills VO per Dr. Brad Fair.

## 2022-10-14 ENCOUNTER — TRANSCRIBE ORDER (OUTPATIENT)
Dept: REGISTRATION | Age: 67
End: 2022-10-14

## 2022-10-14 ENCOUNTER — TELEPHONE (OUTPATIENT)
Dept: INTERNAL MEDICINE CLINIC | Age: 67
End: 2022-10-14

## 2022-10-14 ENCOUNTER — HOSPITAL ENCOUNTER (OUTPATIENT)
Dept: GENERAL RADIOLOGY | Age: 67
Discharge: HOME | End: 2022-10-14
Payer: MEDICARE

## 2022-10-14 DIAGNOSIS — M25.562 LEFT KNEE PAIN: ICD-10-CM

## 2022-10-14 DIAGNOSIS — M23.8X2 LAXITY OF LEFT ANTERIOR CRUCIATE LIGAMENT: ICD-10-CM

## 2022-10-14 DIAGNOSIS — M23.8X2 LAXITY OF LEFT ANTERIOR CRUCIATE LIGAMENT: Primary | ICD-10-CM

## 2022-10-14 PROCEDURE — 73562 X-RAY EXAM OF KNEE 3: CPT

## 2022-10-14 NOTE — TELEPHONE ENCOUNTER
Reason for call:  Spoke with pt. He requested a call back from nurse or    In regards his knee.     Is this a new problem: yes     Date of last appointment:  3/8/2022     Can we respond via [employer]: no    Best call back number:    Ivor Opitz    [phone]

## 2022-10-18 ENCOUNTER — OFFICE VISIT (OUTPATIENT)
Dept: INTERNAL MEDICINE CLINIC | Age: 67
End: 2022-10-18
Payer: MEDICARE

## 2022-10-18 VITALS
HEART RATE: 82 BPM | RESPIRATION RATE: 18 BRPM | TEMPERATURE: 98.3 F | WEIGHT: 200 LBS | BODY MASS INDEX: 28.63 KG/M2 | DIASTOLIC BLOOD PRESSURE: 76 MMHG | SYSTOLIC BLOOD PRESSURE: 144 MMHG | HEIGHT: 70 IN | OXYGEN SATURATION: 98 %

## 2022-10-18 DIAGNOSIS — M25.562 ACUTE PAIN OF LEFT KNEE: Primary | ICD-10-CM

## 2022-10-18 DIAGNOSIS — Z23 ENCOUNTER FOR IMMUNIZATION: ICD-10-CM

## 2022-10-18 PROCEDURE — 99213 OFFICE O/P EST LOW 20 MIN: CPT | Performed by: INTERNAL MEDICINE

## 2022-10-18 PROCEDURE — G8754 DIAS BP LESS 90: HCPCS | Performed by: INTERNAL MEDICINE

## 2022-10-18 PROCEDURE — G8536 NO DOC ELDER MAL SCRN: HCPCS | Performed by: INTERNAL MEDICINE

## 2022-10-18 PROCEDURE — G8417 CALC BMI ABV UP PARAM F/U: HCPCS | Performed by: INTERNAL MEDICINE

## 2022-10-18 PROCEDURE — 90694 VACC AIIV4 NO PRSRV 0.5ML IM: CPT | Performed by: INTERNAL MEDICINE

## 2022-10-18 PROCEDURE — 1101F PT FALLS ASSESS-DOCD LE1/YR: CPT | Performed by: INTERNAL MEDICINE

## 2022-10-18 PROCEDURE — G8753 SYS BP > OR = 140: HCPCS | Performed by: INTERNAL MEDICINE

## 2022-10-18 PROCEDURE — G8510 SCR DEP NEG, NO PLAN REQD: HCPCS | Performed by: INTERNAL MEDICINE

## 2022-10-18 PROCEDURE — 3017F COLORECTAL CA SCREEN DOC REV: CPT | Performed by: INTERNAL MEDICINE

## 2022-10-18 PROCEDURE — G8427 DOCREV CUR MEDS BY ELIG CLIN: HCPCS | Performed by: INTERNAL MEDICINE

## 2022-10-18 PROCEDURE — G0008 ADMIN INFLUENZA VIRUS VAC: HCPCS | Performed by: INTERNAL MEDICINE

## 2022-10-18 NOTE — PROGRESS NOTES
Hui Orozco is a 75 y.o. male who was seen in clinic today (10/18/2022) for an acute visit. Assessment & Plan:   Below is the assessment and plan developed based on review of pertinent history, physical exam, labs, studies, and medications. 1. Acute pain of left knee  Comments:  new dx, differential reviewed, favor meniscus based on exam and history, xray normal, has not had sucess w/ chiropractor, will get MRI  Orders:  -     MRI KNEE LT WO CONT; Future  2. Encounter for immunization  -     INFLUENZA, FLUAD, (AGE 72 Y+), IM, PF, 0.5 ML      Follow-up and Dispositions    Return if symptoms worsen or fail to improve. Subjective/Objective:   Lise Phoenix was seen today for Knee Injury    Pain Review:  He presents do to pain of his left knee that is secondary to over use at [facility]. It started 2 months ago. Since it started his pain had improved, never resolved. It was doing okay then 10  days ago it got worse. He describes the pain as aching. It is constant but fluctuating in intensity. The pain radiates: no where. He denies any locking up. It has given out due to pain. Exacerbating factors identifiable by patient are laying and walking. He has tried the following: ice, HEP, and chiropractor x 3 times. These have been: not very effective. Previous workup: xray on 10/14. Prior to Admission medications    Medication Sig Start Date End Date Taking? Authorizing Provider   atorvastatin (LIPITOR) 10 mg tablet TAKE 1 TABLET BY MOUTH EVERY DAY 9/14/22  Yes Marj Shelton MD   amLODIPine (NORVASC) 10 mg tablet Take 1 Tablet by mouth daily. 5/20/22  Yes Marj Shelton MD   losartan (COZAAR) 50 mg tablet Take 1 Tablet by mouth daily. 5/20/22  Yes Marj Shelton MD   Denta 5000 Plus 1.1 % crea USE EVERY DAY IN PLACE OF REGULAR TOOTHPASTE 12/14/20  Yes Provider, Historical   ergocalciferol, vitamin D2, (VITAMIN D2 PO) Take  by mouth daily.    Yes Provider, Historical nirmatrelvir-ritonavir (Paxlovid, EUA,) 150 mg x 2- 100 mg tablet Per box  Patient not taking: Reported on 10/18/2022 6/5/22   Hailey Brown MD           Physical Exam  Musculoskeletal:      Left knee: Effusion and bony tenderness present. Tenderness present over the medial joint line. No lateral joint line or patellar tendon tenderness. Comments: Left knee: stable to testing        Visit Vitals  BP (!) 144/76   Pulse 82   Temp 98.3 °F (36.8 °C) (Temporal)   Resp 18   Ht 5' 10\" (1.778 m)   Wt 200 lb (90.7 kg)   SpO2 98%   BMI 28.70 kg/m²         Advised him to call back or return to office if symptoms worsen/change/persist.  Discussed expected course/resolution/complications of diagnosis in detail with patient. Medication risks/benefits/costs/interactions/alternatives discussed with patient.     Carmelita Pozo MD

## 2022-10-18 NOTE — PROGRESS NOTES
Gisele Zimmer  is a 75 y.o.  male  who present for routine immunizations. Prior to vaccine administration: Consent was obtained. Risks and adverse reactions were discussed. The patient was provided the VIS and they were given an opportunity to ask questions; all questions were addressed. He  denies any symptoms, reactions or allergies that would exclude them from being immunized today. There were no adverse reactions observed post vaccination. Patient was advised to seek medical or call the office with any questions or concerns post vaccination. Patient verbalized understanding.   Zane Patiño

## 2022-10-31 ENCOUNTER — HOSPITAL ENCOUNTER (OUTPATIENT)
Dept: MRI IMAGING | Age: 67
Discharge: HOME | End: 2022-10-31
Attending: INTERNAL MEDICINE
Payer: MEDICARE

## 2022-10-31 DIAGNOSIS — M25.562 ACUTE PAIN OF LEFT KNEE: ICD-10-CM

## 2022-10-31 PROCEDURE — 73721 MRI JNT OF LWR EXTRE W/O DYE: CPT

## 2022-11-01 RX ORDER — AMLODIPINE BESYLATE 10 MG/1
10 TABLET ORAL DAILY
Qty: 30 TABLET | Refills: 0 | Status: SHIPPED | OUTPATIENT
Start: 2022-11-01

## 2022-11-01 NOTE — TELEPHONE ENCOUNTER
Per VO of Dr. Encinas Blood: 11/9/2021    Future Appointments   Date Time Provider Eileen Damon   3/14/2023  7:40 AM Rachel Garcia MD [facility]       Requested Prescriptions     Signed Prescriptions Disp Refills    amLODIPine (NORVASC) 10 mg tablet 30 Tablet 0     Sig: Take 1 Tablet by mouth daily.  Indications: PT NEEDS APPT FOR FURTHER REFILLS     Authorizing Provider: Catherine Combs     Ordering User: Gilbertodaaristeo Pump   '

## 2022-11-28 RX ORDER — AMLODIPINE BESYLATE 10 MG/1
TABLET ORAL
Qty: 30 TABLET | Refills: 0 | OUTPATIENT
Start: 2022-11-28

## 2022-11-28 NOTE — TELEPHONE ENCOUNTER
[facility] Emergency Department    [address] [facility] [address]    Phone:  [phone]    Fax:  [phone]                                       Aramis Perez   MRN: [id number]    Department:  [facility] Emergency Department   Date of Visit:  7/14/2018           Patient Information     Date Of Birth          1992        Your diagnoses for this visit were:     Dehydration        You were seen by Luan Cunningham MD.      Follow-up Information     Follow up with Stefania Gruber DO.    Specialty:  Family Practice    Why:  As needed    Contact information:    [facility]  [address] [facility] [address]  [phone]          Discharge Instructions         Dehydration (Adult)  Dehydration occurs when your body loses too much fluid. This may be the result of prolonged vomiting or diarrhea, excessive sweating, or a high fever. It may also happen if you don t drink enough fluid when you re sick or out in the heat. Misuse of diuretics (water pills) can also be a cause.  Symptoms include thirst and decreased urine output. You may also feel dizzy, weak, fatigued, or very drowsy. The diet described below is usually enough to treat dehydration. In some cases, you may need medicine.  Home care    Drink at least 12 8-ounce glasses of fluid every day to resolve the dehydration. Fluid may include water; orange juice; lemonade; apple, grape, or cranberry juice; clear fruit drinks; electrolyte replacement and sports drinks; and teas and coffee without caffeine. Don't drink alcohol. If you have been diagnosed with a kidney disease, ask your doctor how much and what types of fluids you should drink to prevent dehydration. If you have kidney disease, fluid can build up in the body. This can be dangerous to your health.    If you have a fever, muscle aches, or a headache as a result of a cold or flu, you may take acetaminophen or ibuprofen, unless another medicine was  LOV: 11/9/2021    Future Appointments   Date Time Provider Eileen Almontei   3/14/2023  7:40 AM Sandford Severe, MD WEIM [facility] AMB       Requested Prescriptions     Refused Prescriptions Disp Refills    amLODIPine (NORVASC) 10 mg tablet [Pharmacy Med Name: AMLODIPINE BESYLATE 10 MG TAB] 30 Tablet 0     Sig: TAKE 1 TABLET BY MOUTH DAILY     Refused By: Isha Friend     Reason for Refusal: Appt required, please call patient prescribed. If you have chronic liver or kidney disease, or have ever had a stomach ulcer or gastrointestinal bleeding, talk with your healthcare provider before using these medicines. Don't take aspirin if you are younger than 18 and have a fever. Aspirin raises the chance for severe liver injury.  Follow-up care  Follow up with your healthcare provider, or as advised.  When to seek medical advice  Call your healthcare provider right away if any of these occur:    Continued vomiting    Frequent diarrhea (more than 5 times a day); blood (red or black color) or mucus in diarrhea    Blood in vomit or stool    Swollen abdomen or increasing abdominal pain    Weakness, dizziness, or fainting    Unusual drowsiness or confusion    Reduced urine output or extreme thirst    Fever of 100.4 F (38 C) or higher  Date Last Reviewed: 5/1/2017 2000-2017 [employer]. [address] [place], [place] [address]. All rights reserved. This information is not intended as a substitute for professional medical care. Always follow your healthcare professional's instructions.          24 Hour Appointment Hotline       To make an appointment at any [facility], call [phone] ([phone]). If you don't have a family doctor or clinic, we will help you find one. [facility] clinics are conveniently located to serve the needs of you and your family.             Review of your medicines      Notice     You have not been prescribed any medications.            Procedures and tests performed during your visit     Basic metabolic panel      Orders Needing Specimen Collection     None      Pending Results     No orders found from 7/12/2018 to 7/15/2018.            Pending Culture Results     No orders found from 7/12/2018 to 7/15/2018.            Pending Results Instructions     If you had any lab results that were not finalized at the time of your Discharge, you can call the ED Lab Result RN at [phone]. You will be  "contacted by this team for any positive Lab results or changes in treatment. The nurses are available 7 days a week from 10A to 6:30P.  You can leave a message 24 hours per day and they will return your call.        Test Results From Your Hospital Stay        2018  5:15 PM      Component Results     Component Value Ref Range & Units Status    Sodium 135 133 - 144 mmol/L Final    Potassium 4.2 3.4 - 5.3 mmol/L Final    Chloride 101 94 - 109 mmol/L Final    Carbon Dioxide 27 20 - 32 mmol/L Final    Anion Gap 7 3 - 14 mmol/L Final    Glucose 113 (H) 70 - 99 mg/dL Final    Urea Nitrogen 19 7 - 30 mg/dL Final    Creatinine 1.27 (H) 0.66 - 1.25 mg/dL Final    GFR Estimate 68 >60 mL/min/1.7m2 Final    Non  GFR Calc    GFR Estimate If Black 82 >60 mL/min/1.7m2 Final    African American GFR Calc    Calcium 9.1 8.5 - 10.1 mg/dL Final                Thank you for choosing [facility]       Thank you for choosing [facility] for your care. Our goal is always to provide you with excellent care. Hearing back from our patients is one way we can continue to improve our services. Please take a few minutes to complete the written survey that you may receive in the mail after you visit with us. Thank you!        [employer] Information     [employer] lets you send messages to your doctor, view your test results, renew your prescriptions, schedule appointments and more. To sign up, go to [email or web address] . Click on \"Log in\" on the left side of the screen, which will take you to the Welcome page. Then click on \"Sign up Now\" on the right side of the page.     You will be asked to enter the access code listed below, as well as some personal information. Please follow the directions to create your username and password.     Your access code is: [email or web address]  Expires: 10/12/2018  6:01 PM     Your access code will  in 90 days. If you need help or a new code, please call your [facility] clinic or [phone].        Care " EveryWhere ID     This is your Care EveryWhere ID. This could be used by other organizations to access your [facility] medical records  [id number]        Equal Access to Services     MICHELLE MENDOZA : Cary Chopra, carlos eduardo nguyen, sil tinajero, jocelyn umana. So [facility] [phone].    ATENCIÓN: Si habla español, tiene a fairchild disposición servicios gratuitos de asistencia lingüística. Llame al [phone].    We comply with applicable federal civil rights laws and [place] laws. We do not discriminate on the basis of race, color, national origin, age, disability, sex, sexual orientation, or gender identity.            After Visit Summary       This is your record. Keep this with you and show to your community pharmacist(s) and doctor(s) at your next visit.

## 2022-12-05 RX ORDER — LOSARTAN POTASSIUM 50 MG/1
TABLET ORAL
Qty: 90 TABLET | Refills: 1 | OUTPATIENT
Start: 2022-12-05

## 2022-12-05 NOTE — TELEPHONE ENCOUNTER
Patient needs appt for further refills    LOV: 11/9/2021    Future Appointments   Date Time Provider Eileen Marisol   3/14/2023  7:40 AM MD AYAD Amato BS AMB       Requested Prescriptions     Refused Prescriptions Disp Refills    losartan (COZAAR) 50 mg tablet [Pharmacy Med Name: LOSARTAN POTASSIUM 50 MG TAB] 90 Tablet 1     Sig: TAKE 1 TABLET BY MOUTH EVERY DAY     Refused By: Tamiko Christine     Reason for Refusal: Appt required, please call patient

## 2022-12-21 ENCOUNTER — OFFICE VISIT (OUTPATIENT)
Dept: CARDIOLOGY CLINIC | Age: 67
End: 2022-12-21
Payer: MEDICARE

## 2022-12-21 VITALS
RESPIRATION RATE: 16 BRPM | WEIGHT: 200.6 LBS | OXYGEN SATURATION: 96 % | DIASTOLIC BLOOD PRESSURE: 80 MMHG | SYSTOLIC BLOOD PRESSURE: 156 MMHG | HEART RATE: 76 BPM | HEIGHT: 70 IN | BODY MASS INDEX: 28.72 KG/M2

## 2022-12-21 DIAGNOSIS — I10 HYPERTENSION, ESSENTIAL: Primary | ICD-10-CM

## 2022-12-21 RX ORDER — LOSARTAN POTASSIUM 100 MG/1
100 TABLET ORAL DAILY
Qty: 90 TABLET | Refills: 3 | Status: SHIPPED | OUTPATIENT
Start: 2022-12-21

## 2022-12-21 NOTE — PROGRESS NOTES
[address] [facility]                                                            OFFICE NOTE        Miranda Lassiter M.D.,FABRICIO Scott Fat   1955  [id number]    Date/Time:  12/21/202212:47 PM            SUBJECTIVE:    Doing very well continues to exercise avidly and has had no chest pain or shortness of breath or palpitation presyncopal syncopal episodes. Assessment/Plan    1. Hypertension: Blood pressure remains somewhat elevated. He was unable to tolerate Bystolic on account of the severe side effects. He is able is able to tolerate Norvasc with no issues. He is also on losartan. Increase losartan to 100 mg daily. He will let me know if his blood pressure remains above 140    We will discussed the exercise and nutrition. Reduce salt intake. 2.  CAD: He does have subclinical CAD based on calcium score of February 2021 with a calcium score 292. He remains completely asymptomatic at this time. Continue with aggressive risk factors modifications. We need better blood pressure control. Continue Lipitor. 3.  Hyperlipidemia: Continue Lipitor closely followed by primary care physician. 4.  Normal EKG: He does have a right bundle branch block on his EKG. This is chronic. Unclear etiology he denies any history obstructive sleep apnea. Repeat echocardiogram in 1 year from now. Otherwise no additional interventions I will see him back in 1 year or sooner if any question or problems arise prior to that. HPI     Very pleasant 75years old gentleman with apparently unremarkable past medical history. He only takes vitamin D. He does have borderline hyperlipidemia. He denies any history of hypertension or diabetes. He was seen here  for cardiac evaluation of  abnormal electrocardiogram  And also found to have htn     Past medical history: As above.      Past surgical history: None. Social history: He does not smoke and drinks occasionally , he is a . Family history: Both of his brothers had myocardial infarction and CAD in mid 46s. CARDIAC STUDIES        02/05/21    ECHO STRESS 02/05/2021 2/12/2021    Interpretation Summary  · Baseline ECG: Normal sinus rhythm, non-specific ST-T wave abnormalities, LVH with repolarization abnormality (strain). · Moderate risk Duke treadmill score. · Inconclusive stress test.  · Negative stress echocardiogram for evidence of ischemia. Low risk study. · Probable false positive ECG given HTN response to exercise and baseline nstt  · HTN response to exercise (206 systolic)    Signed by: Adrian Harrison MD on 2/5/2021  4:46 PM                              EKG Results       None                IMAGING      MRI Results (most recent):  Results from Hospital Encounter encounter on 10/31/22    MRI KNEE LT WO CONT    Narrative  EXAM: MRI KNEE LT WO CONT    INDICATION: Pain    COMPARISON: None    TECHNIQUE: Axial T2 fat-saturated; coronal T1 and proton density fat-saturated;  and sagittal T2 fat-saturated, proton density fat-saturated, and gradient echo  MRI of the left knee . CONTRAST: Radiographs 10/14/2022    FINDINGS: Bone marrow: Subchondral stress fracture shown in the peripheral  aspect of the anterior-mid weightbearing medial femoral condyle with surrounding  osseous edema. Joint fluid: Small knee effusion. Tiny Baker's cyst.    Collateral ligaments and posterior, lateral corner: Intact. Medial meniscus: Complex tear at junction of body and posterior horn with subtle  mildly displaced fragment at inferior margin of meniscus. Lateral meniscus: Intact. ACL and PCL: Intact. Tendons: Intact. Muscles: Within normal limits. Patellofemoral alignment: No patellar subluxation/tilt. Trochlear groove is not  hypoplastic.  TT-TG distance: Normal.    Articular cartilage: Subcentimeter foci of intermediate grade chondral  derangement in medial facet of patella and medial facet of the femoral trochlea  with minimal underlying reactive bony signal. No medial or lateral compartment  chondral derangement demonstrated. Soft tissue mass: None. Impression  1. Complex tearing of medial meniscus at junction of body and posterior horn. 2. Adjacent peripheral subchondral stress fracture of medial femoral condyle. 3. Foci of intermediate grade medial patellar and medial trochlear  chondromalacia. CT Results (most recent):  Results from Hospital Encounter encounter on 02/01/21    CT HEART W/O CONT WITH CALCIUM    Narrative  EXAM: CT HEART W/O CONT WITH CALCIUM    INDICATION: . Encounter for prophylactic measures, unspecified    COMPARISON: None. TECHNIQUE: Unenhanced multislice helical CT of the heart was performed on a  64-slice multiple detector row CT system (NyxoahT). Quantitative coronary artery  CT calcium scoring was performed using [email or web address] software. No intravenous contrast was  administered. CT dose reduction was achieved through use of a standardized  protocol tailored for this examination and automatic exposure control for dose  modulation. FINDINGS:  The coronary calcium in each vessel is as follows:    Left main coronary artery: 20  Left anterior descending coronary artery: 95  Left circumflex coronary artery: 0  Right coronary artery: 54  Posterior descending coronary artery: 51  Diagonal: 65  PLB: 7. Total calcium score: 292    Calcium score interpretation:  0-0 = No evidence of CAD  1-10 = Minimal evidence of CAD   = Mild evidence of CAD  101-400 = Moderate evidence of CAD  >400 = Extensive evidence of CAD    A score of 292 is at the 50th percentile rank. Therefore, 50% of the males aged  74-77 will have a higher calcium score. Chest CT findings:  No significant abnormality in the incidentally imaged lower  lungs. The entire lung fields are not imaged on this examination.  No evidence of  mass or lymphadenopathy. The incidentally imaged portions of the upper abdominal  organs are unremarkable. Impression  Total calcium score of 292. Moderate evidence of coronary artery disease. The  result should be discussed with the patient taking into account other risk  factors such as age, gender, family history, diabetes, smoking or high  cholesterol levels. XR Results (most recent):  Results from Hospital Encounter encounter on 10/14/22    XR KNEE LT 3 V    Narrative  history: Left knee pain    COMPARISON: None    FINDINGS:    3 views of the left knee submitted for review. No evidence for acute fracture or dislocation. Impression  No evidence for acute fracture or dislocation. Past Medical History:   Diagnosis Date    GERD (gastroesophageal reflux disease)     Hypercholesterolemia     Hypertension, essential 3/8/2021    Mixed conductive and sensorineural hearing loss of both ears 3/8/2021    Pure hypercholesterolemia 3/8/2021    Trauma mid 1990's    closed head trauma; no seizures/residua     Past Surgical History:   Procedure Laterality Date    HX COLONOSCOPY  4/4/2004    Dr. Dave Parks COLONOSCOPY  12/10/2013    Normal    HX COLONOSCOPY  2018    HX CYST REMOVAL Right 11/2021    posterior R leg     Social History     Tobacco Use    Smoking status: Never    Smokeless tobacco: Never   Vaping Use    Vaping Use: Never used   Substance Use Topics    Alcohol use: Yes     Alcohol/week: 14.0 standard drinks     Types: 14 Glasses of wine per week    Drug use: No     Family History   Problem Relation Age of Onset    Alcohol abuse Father     Hypertension Brother     Coronary Art Dis Brother 61        CABG @ 62    Hypertension Brother     Coronary Art Dis Brother 61        CABGx5    No Known Problems Sister         no contact    Heart Disease Maternal Aunt     Hypertension Son      No Known Allergies      There were no vitals taken for this visit.       There were no vitals filed for this visit. Review of Systems:   Pertinent items are noted in the History of Present Illness. Visit Vitals  BP (!) 156/80 (BP 1 Location: Left upper arm, BP Patient Position: Sitting, BP Cuff Size: Adult)   Pulse 76   Resp 16   Ht 5' 10\" (1.778 m)   Wt 200 lb 9.6 oz (91 kg)   SpO2 96%   BMI 28.78 kg/m²     General Appearance:  Well developed, well nourished,alert and oriented x 3, and individual in no acute distress. Ears/Nose/Mouth/Throat:   Hearing grossly normal.         Neck: Supple. Chest:   Lungs clear to auscultation bilaterally. Cardiovascular:  Regular rate and rhythm, S1, S2 normal, no murmur. Abdomen:   Soft, non-tender, bowel sounds are active. Extremities: No edema bilaterally. Skin: Warm and dry. Current Outpatient Medications on File Prior to Visit   Medication Sig Dispense Refill    amLODIPine (NORVASC) 10 mg tablet Take 1 Tablet by mouth daily. Indications: PT NEEDS APPT FOR FURTHER REFILLS 30 Tablet 0    atorvastatin (LIPITOR) 10 mg tablet TAKE 1 TABLET BY MOUTH EVERY DAY 90 Tablet 1    nirmatrelvir-ritonavir (Paxlovid, EUA,) 150 mg x 2- 100 mg tablet Per box (Patient not taking: Reported on 10/18/2022) 1 Box 0    losartan (COZAAR) 50 mg tablet Take 1 Tablet by mouth daily. 7 Tablet 0    Denta 5000 Plus 1.1 % crea USE EVERY DAY IN PLACE OF REGULAR TOOTHPASTE      ergocalciferol, vitamin D2, (VITAMIN D2 PO) Take  by mouth daily. No current facility-administered medications on file prior to visit. Anibal Cedillo had no medications administered during this visit. Current Outpatient Medications   Medication Sig    amLODIPine (NORVASC) 10 mg tablet Take 1 Tablet by mouth daily.  Indications: PT NEEDS APPT FOR FURTHER REFILLS    atorvastatin (LIPITOR) 10 mg tablet TAKE 1 TABLET BY MOUTH EVERY DAY    nirmatrelvir-ritonavir (Paxlovid, EUA,) 150 mg x 2- 100 mg tablet Per box (Patient not taking: Reported on 10/18/2022)    losartan (COZAAR) 50 mg tablet Take 1 Tablet by mouth daily. Denta 5000 Plus 1.1 % crea USE EVERY DAY IN PLACE OF REGULAR TOOTHPASTE    ergocalciferol, vitamin D2, (VITAMIN D2 PO) Take  by mouth daily. No current facility-administered medications for this visit. Lab Results   Component Value Date/Time    Cholesterol, total 148 03/08/2022 08:24 AM    HDL Cholesterol 62 03/08/2022 08:24 AM    LDL, calculated 67.8 03/08/2022 08:24 AM    VLDL, calculated 18.2 03/08/2022 08:24 AM    Triglyceride 91 03/08/2022 08:24 AM    CHOL/HDL Ratio 2.4 03/08/2022 08:24 AM       Lab Results   Component Value Date/Time    Sodium 136 03/08/2022 08:24 AM    Potassium 4.3 03/08/2022 08:24 AM    Chloride 105 03/08/2022 08:24 AM    CO2 27 03/08/2022 08:24 AM    Anion gap 4 (L) 03/08/2022 08:24 AM    Glucose 96 03/08/2022 08:24 AM    BUN 28 (H) 03/08/2022 08:24 AM    Creatinine 1.16 03/08/2022 08:24 AM    BUN/Creatinine ratio 24 (H) 03/08/2022 08:24 AM    GFR est AA >60 03/08/2022 08:24 AM    GFR est non-AA >60 03/08/2022 08:24 AM    Calcium 9.0 03/08/2022 08:24 AM       Lab Results   Component Value Date/Time    ALT (SGPT) 32 03/08/2022 08:24 AM    Alk.  phosphatase 64 03/08/2022 08:24 AM    Bilirubin, direct 0.15 04/15/2021 07:55 AM    Bilirubin, total 0.6 03/08/2022 08:24 AM       Lab Results   Component Value Date/Time    WBC 7.0 03/08/2022 08:24 AM    HGB 14.3 03/08/2022 08:24 AM    HCT 43.2 03/08/2022 08:24 AM    PLATELET 918 12/29/2937 08:24 AM    MCV 88.0 03/08/2022 08:24 AM       Lab Results   Component Value Date/Time    TSH 1.040 10/24/2019 10:40 AM         Lab Results   Component Value Date/Time    Cholesterol, total 148 03/08/2022 08:24 AM    Cholesterol, total 170 04/15/2021 07:55 AM    Cholesterol, total 216 (H) 11/30/2020 12:00 AM    Cholesterol, total 193 10/24/2019 10:40 AM    Cholesterol, total 223 (H) 11/15/2018 08:05 AM    HDL Cholesterol 62 03/08/2022 08:24 AM    HDL Cholesterol 77 04/15/2021 07:55 AM    HDL Cholesterol 62 11/30/2020 12:00 AM    HDL Cholesterol 76 10/24/2019 10:40 AM    HDL Cholesterol 67 11/15/2018 08:05 AM    LDL, calculated 67.8 03/08/2022 08:24 AM    LDL, calculated 79 04/15/2021 07:55 AM    LDL, calculated 135 (H) 11/30/2020 12:00 AM    LDL, calculated 106 (H) 10/24/2019 10:40 AM    LDL, calculated 132 (H) 11/15/2018 08:05 AM    LDL, calculated 112 (H) 11/09/2017 12:00 AM    LDL, calculated 113 (H) 01/25/2011 12:00 AM    Triglyceride 91 03/08/2022 08:24 AM    Triglyceride 73 04/15/2021 07:55 AM    Triglyceride 108 11/30/2020 12:00 AM    Triglyceride 56 10/24/2019 10:40 AM    Triglyceride 119 11/15/2018 08:05 AM    CHOL/HDL Ratio 2.4 03/08/2022 08:24 AM                Please note that this dictation was completed with [employer], the [employer] voice recognition software. Quite often unanticipated grammatical, syntax, homophones, and other interpretative errors are inadvertently transcribed by the computer software. Please disregard these errors. Please excuse any errors that have escaped final proofreading.

## 2022-12-21 NOTE — PATIENT INSTRUCTIONS
Please increase Losartan 100mg daily - you may take 2 tabs at once until you get your new prescription.

## 2023-01-12 ENCOUNTER — TELEPHONE (OUTPATIENT)
Dept: CARDIOLOGY CLINIC | Age: 68
End: 2023-01-12

## 2023-01-12 NOTE — TELEPHONE ENCOUNTER
TC to pt, ID verified. Pt states he has had an itchy chest and stomach since increasing his Losartan dosage. States he tried to see if his body would just end up getting used to the increased dose, but it has not and he continues to have the itchiness. No other problems. Advised he hold off on taking the losartan again until I was able to get next steps from a provider. Advised he be seen in ER if he had any swelling in his mouth or throat or any shortness of breath.

## 2023-01-12 NOTE — TELEPHONE ENCOUNTER
Pt stated he is taking the losarton 100 mg and his dosage was increased to 100mg and he is having negative reactions. Pt states he is having an itch on his chest. Pt denied having any others complications. Pt would like to speak with the nurse to ensure this is not a serious cardiac related reaction.      Pt # [phone]

## 2023-01-13 RX ORDER — VALSARTAN 320 MG/1
320 TABLET ORAL DAILY
Qty: 90 TABLET | Refills: 1 | Status: SHIPPED | OUTPATIENT
Start: 2023-01-13

## 2023-01-13 NOTE — TELEPHONE ENCOUNTER
Lisandro Rodriguez, NP  You 18 hours ago (3:19 PM)     KD  For whatever reason 100mg of losartan is causing a reaction   Please stop losartan and begin valsartan 320mg daily   Please ask him to call back next week with an update    TC to pt, ID verified. Advised as above per RAHUL Hdez. Pt was concerned that possibly he should just stop meds all together to identify cause of rash. Advised at this point we do not want his BP to get too elevated so for now just switch the medications. Pt understands. No further questions.

## 2023-01-16 ENCOUNTER — TELEPHONE (OUTPATIENT)
Dept: CARDIOLOGY CLINIC | Age: 68
End: 2023-01-16

## 2023-01-16 NOTE — TELEPHONE ENCOUNTER
Pt called about new prescription, pt stated it was not sent to the pharmacy on Friday, informed pt that the [facility] should have the valsartan 320mg, pt stated he called on 1/15/23 and the pharmacy did not have it, please advise      Pt# [phone]

## 2023-01-16 NOTE — TELEPHONE ENCOUNTER
TC to pt, ID verified. Advised I called pharmacy and confirmed they did receive RX on Friday, however they needed to order it. Advised per pharmacy a shipment came in today and it should be on that. Pt understands. Asks if the valsartan is a beta blocker, advised it is not. No further questions.

## 2023-01-19 ENCOUNTER — TELEPHONE (OUTPATIENT)
Dept: INTERNAL MEDICINE CLINIC | Age: 68
End: 2023-01-19

## 2023-01-19 DIAGNOSIS — E78.00 PURE HYPERCHOLESTEROLEMIA: Primary | ICD-10-CM

## 2023-01-19 DIAGNOSIS — Z12.5 PROSTATE CANCER SCREENING: ICD-10-CM

## 2023-01-19 DIAGNOSIS — I10 HYPERTENSION, ESSENTIAL: ICD-10-CM

## 2023-01-19 NOTE — TELEPHONE ENCOUNTER
Not sure what he is referring to about his [address] - I show PCP correctly. I updated his most recent COVID booster in his chart.   Pt scheduled for [address] on 3/14/2023 - does he need labs prior or day of?

## 2023-01-19 NOTE — TELEPHONE ENCOUNTER
----- Message from Kali Recinos sent at 1/19/2023 10:11 AM EST -----  Subject: Message to Provider    QUESTIONS  Information for Provider? Patient states he was told that the office would fix his mychart . Patient states that his pcp states that it's his cardiologist .   2.  Patient states on 1/10/23 he received his most recent booster tto be updated on chart as well . 3. Patient would like to know if he needs labs before or day of appointment.  ---------------------------------------------------------------------------  --------------  [id number]  [phone]; OK to leave message on voicemail  ---------------------------------------------------------------------------  --------------  SCRIPT ANSWERS  Relationship to Patient?  Self

## 2023-01-19 NOTE — TELEPHONE ENCOUNTER
Returned call to patient and provided [facility] # to further assess his issues as PCP appears correct on our end. Pt will plan to report to [facility] week before appointment for fasting labs.

## 2023-01-19 NOTE — TELEPHONE ENCOUNTER
I'm not sure what he is seeing in [address], but it looks correct on our end. Labs ordered. Needs to wait until March to get these done, ideally the week before our visit. He does need to be fasting. He should use LC.

## 2023-03-01 RX ORDER — AMLODIPINE BESYLATE 10 MG/1
TABLET ORAL
Qty: 90 TABLET | Refills: 1 | Status: SHIPPED | OUTPATIENT
Start: 2023-03-01

## 2023-03-01 NOTE — TELEPHONE ENCOUNTER
Cardiologist: Dr. Barbara Barroso    Last appt: 12/21/2022  Future Appointments   Date Time Provider Eileen Damon   3/14/2023  7:40 AM MD AYAD Martin BS AMB   12/19/2023  1:00 PM MD MELISSA Stewart BS AMB       Requested Prescriptions     Signed Prescriptions Disp Refills    amLODIPine (NORVASC) 10 mg tablet 90 Tablet 1     Sig: TAKE 1 TABLET BY MOUTH DAILY     Authorizing Provider: Maggie Orosco     Ordering User: ASH RENO         Refills VO per Dr. Barbara Barroso.

## 2023-03-14 ENCOUNTER — OFFICE VISIT (OUTPATIENT)
Dept: INTERNAL MEDICINE CLINIC | Age: 68
End: 2023-03-14
Payer: MEDICARE

## 2023-03-14 VITALS
HEIGHT: 70 IN | DIASTOLIC BLOOD PRESSURE: 80 MMHG | BODY MASS INDEX: 28.49 KG/M2 | SYSTOLIC BLOOD PRESSURE: 144 MMHG | RESPIRATION RATE: 18 BRPM | TEMPERATURE: 98.2 F | OXYGEN SATURATION: 99 % | HEART RATE: 72 BPM | WEIGHT: 199 LBS

## 2023-03-14 DIAGNOSIS — Z71.89 ADVANCED CARE PLANNING/COUNSELING DISCUSSION: ICD-10-CM

## 2023-03-14 DIAGNOSIS — M25.512 CHRONIC LEFT SHOULDER PAIN: ICD-10-CM

## 2023-03-14 DIAGNOSIS — I10 WHITE COAT SYNDROME WITH DIAGNOSIS OF HYPERTENSION: ICD-10-CM

## 2023-03-14 DIAGNOSIS — G89.29 CHRONIC LEFT SHOULDER PAIN: ICD-10-CM

## 2023-03-14 DIAGNOSIS — Z00.00 MEDICARE ANNUAL WELLNESS VISIT, SUBSEQUENT: Primary | ICD-10-CM

## 2023-03-14 DIAGNOSIS — E78.00 PURE HYPERCHOLESTEROLEMIA: ICD-10-CM

## 2023-03-14 PROCEDURE — G8536 NO DOC ELDER MAL SCRN: HCPCS | Performed by: INTERNAL MEDICINE

## 2023-03-14 PROCEDURE — G8510 SCR DEP NEG, NO PLAN REQD: HCPCS | Performed by: INTERNAL MEDICINE

## 2023-03-14 PROCEDURE — G8417 CALC BMI ABV UP PARAM F/U: HCPCS | Performed by: INTERNAL MEDICINE

## 2023-03-14 PROCEDURE — 3017F COLORECTAL CA SCREEN DOC REV: CPT | Performed by: INTERNAL MEDICINE

## 2023-03-14 PROCEDURE — 1101F PT FALLS ASSESS-DOCD LE1/YR: CPT | Performed by: INTERNAL MEDICINE

## 2023-03-14 PROCEDURE — G0439 PPPS, SUBSEQ VISIT: HCPCS | Performed by: INTERNAL MEDICINE

## 2023-03-14 PROCEDURE — G8427 DOCREV CUR MEDS BY ELIG CLIN: HCPCS | Performed by: INTERNAL MEDICINE

## 2023-03-14 NOTE — PATIENT INSTRUCTIONS
Medicare Wellness Visit, Male    The best way to live healthy is to have a lifestyle where you eat a well-balanced diet, exercise regularly, limit alcohol use, and quit all forms of tobacco/nicotine, if applicable. Regular preventive services are another way to keep healthy. Preventive services (vaccines, screening tests, monitoring & exams) can help personalize your care plan, which helps you manage your own care. Screening tests can find health problems at the earliest stages, when they are easiest to treat. [facility] follows the current, evidence-based guidelines published by the [facility] Leroy Elias ([facility]) when recommending preventive services for our patients. Because we follow these guidelines, sometimes recommendations change over time as research supports it. (For example, a prostate screening blood test is no longer routinely recommended for men with no symptoms). Of course, you and your doctor may decide to screen more often for some diseases, based on your risk and co-morbidities (chronic disease you are already diagnosed with). Preventive services for you include:  - Medicare offers their members a free annual wellness visit, which is time for you and your primary care provider to discuss and plan for your preventive service needs.  Take advantage of this benefit every year!    -Over the age of 72 should receive the recommended pneumonia vaccines.   -All adults should have a flu vaccine yearly.  -All adults should receive a tetanus vaccine every 10 years.  -Over the age of 48 should receive the shingles vaccines.    -All adults should be screened once for Hepatitis C.  -All adults age 38-68 who are overweight should have a diabetes screening test once every three years.   -Other screening tests & preventive services for persons with diabetes include: an eye exam to screen for diabetic retinopathy, a kidney function test, a foot exam, and stricter control over your cholesterol.   -Cardiovascular screening for adults with routine risk involves an electrocardiogram (ECG) at intervals determined by the provider.     -Colorectal cancer screening should be done for adults age 43-69 with no increased risk factors for colorectal cancer. There are a number of acceptable methods of screening for this type of cancer. Each test has its own benefits and drawbacks. Discuss with your provider what is most appropriate for you during your annual wellness visit. The different tests include: colonoscopy (considered the best screening method), a fecal occult blood test, a fecal DNA test, and sigmoidoscopy.    -Lung cancer screening is recommended annually with a low dose CT scan for adults between age 54 and 68, who have smoked at least 30 pack years (equivalent of 1 pack per day for 30 days), and who is a current smoker or quit less than 15 years ago. -An Abdominal Aortic Aneurysm (AAA) Screening is recommended for men age 73-68 who has ever smoked in their lifetime. Here is a list of your current Health Maintenance items (your personalized list of preventive services) with a due date: There are no preventive care reminders to display for this patient. Learning About Living Ingrid Jenkins  What is a living will? A living will is a legal form you use to write down the kind of care you want at the end of your life. It is used by the health professionals who will treat you if you aren't able to decide for yourself. If you put your wishes in writing, your loved ones and others will know what kind of care you want. They won't need to guess. This can ease your mind and be helpful to others. A living will is not the same as an estate or property will. An estate will explains what you want to happen with your money and property after you die. Is a living will a legal document? A living will is a legal document. Each state has its own laws about living [place].  If you move to another state, make sure that your living will is legal in the state where you now live. Or you might use a universal form that has been approved by many states. This kind of form can sometimes be completed and stored online. Your electronic copy will then be available wherever you have a connection to the Internet. In most cases, doctors will respect your wishes even if you have a form from a different state. You don't need an  to complete a living will. But legal advice can be helpful if your state's laws are unclear, your health history is complicated, or your family can't agree on what should be in your living will. You can change your living will at any time. Some people find that their wishes about end-of-life care change as their health changes. In addition to making a living will, think about completing a medical power of  form. This form lets you name the person you want to make end-of-life treatment decisions for you (your \"health care agent\") if you're not able to. Many hospitals and nursing homes will give you the forms you need to complete a living will and a medical power of . Your living will is used only if you can't make or communicate decisions for yourself anymore. If you become able to make decisions again, you can accept or refuse any treatment, no matter what you wrote in your living will. Your state may offer an online registry. This is a place where you can store your living will online so the doctors and nurses who need to treat you can find it right away. What should you think about when creating a living will? Talk about your end-of-life wishes with your family members and your doctor. Let them know what you want. That way the people making decisions for you won't be surprised by your choices. Think about these questions as you make your living will:  Do you know enough about life support methods that might be used?  If not, talk to your doctor so you know what might be done if you can't breathe on your own, your heart stops, or you're unable to swallow. What things would you still want to be able to do after you receive life-support methods? Would you want to be able to walk? To speak? To eat on your own? To live without the help of machines? If you have a choice, where do you want to be cared for? In your home? At a hospital or nursing home? Do you want certain Orthodox practices performed if you become very ill? If you have a choice at the end of your life, where would you prefer to die? At home? In a hospital or nursing home? Somewhere else? Would you prefer to be buried or cremated? Do you want your organs to be donated after you die? What should you do with your living will? Make sure that your family members and your health care agent have copies of your living will. Give your doctor a copy of your living will to keep in your medical record. If you have more than one doctor, make sure that each one has a copy. You may want to put a copy of your living will where it can be easily found. Where can you learn more? Go to http://www.gray.com/. Enter X749 in the search box to learn more about \"Learning About Living [place]. \"  Current as of: August 8, 2016  Content Version: 11.3  © 2784-5981 [employer]. Care instructions adapted under license by [employer] (which disclaims liability or warranty for this information). If you have questions about a medical condition or this instruction, always ask your healthcare professional. [place] [address] any warranty or liability for your use of this information.

## 2023-03-14 NOTE — ACP (ADVANCE CARE PLANNING)
Advance Care Planning   Advance Care Planning (ACP) Physician/NP/PA (Provider) Conversation    Date of ACP Conversation: 03/14/23  Persons included in Conversation:  patient  Length of ACP Conversation in minutes: <16 minutes (Non-Billable)    Authorized Decision Maker (if patient is incapable of making informed decisions):   Next of Kin by law (only applies in absence of a Healthcare Power of  or Legal Guardian)      Primary Decision Maker: [address] [place] [address] [phone]    He has NO advanced directive - recommended completing forms. Reviewed DNR/DNI and patient is not interested- elects Full Code (attempt resuscitation).        Mychal Smart MD

## 2023-03-14 NOTE — PROGRESS NOTES
Shaquille Cm is a 75 y.o. male who was seen in clinic today (3/14/2023) for a full physical.          Assessment & Plan:   Below is the assessment and plan developed based on review of pertinent history, physical exam, labs, studies, and medications. 1. Medicare annual wellness visit, subsequent  Comments:  previous labs reviewed  2. Advanced care planning/counseling discussion  3. White coat syndrome with diagnosis of hypertension  Assessment & Plan:  Well controlled at home, 120's/80's, no changes to medications, he is monitored by specialist. No acute findings meriting change in the plan  4. Pure hypercholesterolemia  Assessment & Plan:  at goal, continue current treatment    5. Chronic left shoulder pain  Comments:  new dx, differential reviewed, will start w/ HEP, he will let me know if he wants to see PT    Follow-up and Dispositions    Return in about 1 year (around 3/14/2024), or if symptoms worsen or fail to improve, for FULL PHYSICAL. Subjective:   Shaniqua Prince is here today for a full physical.      Annual Wellness Visit- Subsequent Visit    Since last visit:  cardiology increased losartan from 50mg to 100mg at his last visit. He reports itching and changed to valsartan. He is tolerating this. He reports home BP is 120's/80's. End of Life Planning: This was discussed with him today and he has NO advanced directive  - add't info provided. Reviewed DNR/DNI and patient is not interested. Depression Screen:  3 most recent PHQ Screens 3/10/2023   Little interest or pleasure in doing things Not at all   Feeling down, depressed, irritable, or hopeless Not at all   Total Score PHQ 2 0         Fall Risk:   Fall Risk Assessment, last 12 mths 3/14/2023   Able to walk? Yes   Fall in past 12 months? 0   Do you feel unsteady? 0   Are you worried about falling 0       Abuse Screen:  Abuse Screening Questionnaire 3/10/2023   Do you ever feel afraid of your partner?  N   Are you in a relationship with someone who physically or mentally threatens you? N   Is it safe for you to go home? Y       Do you average more than 1 drink per night or more than 7 drinks a week: No    In the past three months have you have had more than 4 drinks containing alcohol on one occasion: No          Health Maintenance:   Daily Aspirin: no  AAA Screening: n/a: never smoked    Immunizations:   Covid: up to date   Influenza: up to date   Tetanus: up to date   Shingles: up to date   Pneumonia: up to date  Cancer screening:   Prostate: guidelines reviewed, up to date  Colon: guidelines reviewed, up to date    Functional Ability and Level of Safety:   Hearing:  Hearing: (P) Patient wears hearing aid     Cognition Screen:  Has your family/caregiver stated any concerns about your memory?: (P) No  Cognitive Screening: Normal - Mini Cog Test      Ambulation:  Patient ambulates: (P) with mild difficulty  How far the patient can walk with difficulty: (P) As far as I want. Walking is difficult due to: (P) pain    Activities of Daily Living: The home contains: (P) no safety equipment  Functional ADLs: (P) Patient does total self care  Exercise: very active    Adult Nutrition Screen:  No risk factors noted. Patient Care Team:  Zoila Rebolledo MD as PCP - General (Internal Medicine Physician)  Zoila Rebolledo MD as PCP - [facility] Empaneled Provider  Zack Berman MD (Cardiovascular Disease Physician)  Shira Bernardo MD (Inactive) (Gastroenterology)       The following sections were reviewed & updated as appropriate: Problem List, Allergies, PMH, PSH, FH, and SH. Prior to Admission medications    Medication Sig Start Date End Date Taking? Authorizing Provider   amLODIPine (NORVASC) 10 mg tablet TAKE 1 TABLET BY MOUTH DAILY 3/1/23  Yes Zack Berman MD   valsartan (DIOVAN) 320 mg tablet Take 1 Tablet by mouth daily.  1/13/23  Yes Zack Berman MD   atorvastatin (LIPITOR) 10 mg tablet TAKE 1 TABLET BY MOUTH EVERY DAY 9/14/22 Yes Pau Flores MD   Denta 5000 Plus 1.1 % crea USE EVERY DAY IN PLACE OF REGULAR TOOTHPASTE 12/14/20  Yes Provider, Historical   ergocalciferol, vitamin D2, (VITAMIN D2 PO) Take  by mouth daily. Yes Provider, Historical   nirmatrelvir-ritonavir (Paxlovid, EUA,) 150 mg x 2- 100 mg tablet Per box  Patient not taking: No sig reported 6/5/22   Rudy Whitmore MD          Review of Systems   Constitutional:  Negative for chills and fever. Respiratory:  Negative for cough and shortness of breath. Cardiovascular:  Negative for chest pain and palpitations. Gastrointestinal:  Negative for abdominal pain, blood in stool, constipation, diarrhea, heartburn, nausea and vomiting. Genitourinary:         He denies: nocturia, urinary hesitancy, urinary frequency, weak stream.       Denies trouble getting or maintaining an erection. Denies trouble with AM erections. Musculoskeletal:  Positive for joint pain. Negative for myalgias. L knee is improving. L shoulder started a few months, trouble lifting his arm and putting on a jacket. It will wake him up at night - throbbing. No trauma or specific event. Slightly worse   Neurological:  Negative for tingling, focal weakness and headaches. Endo/Heme/Allergies:  Does not bruise/bleed easily. Psychiatric/Behavioral:  Negative for depression. The patient is not nervous/anxious and does not have insomnia. Objective:   Physical Exam  Constitutional:       General: He is not in acute distress. Appearance: Normal appearance. Eyes:      Conjunctiva/sclera: Conjunctivae normal.   Cardiovascular:      Rate and Rhythm: Regular rhythm. Heart sounds: No murmur heard. Pulmonary:      Effort: Pulmonary effort is normal.      Breath sounds: Normal breath sounds. No decreased breath sounds or wheezing. Abdominal:      General: Bowel sounds are normal.      Palpations: Abdomen is soft. Tenderness: There is no abdominal tenderness. Musculoskeletal:      Left shoulder: No tenderness or bony tenderness. Normal range of motion. Arms:       Right lower leg: No edema. Left lower leg: No edema. Comments: Left Shoulder- Empty Can Test: Negative.  Cross-Chest Test: Negative    Psychiatric:         Mood and Affect: Mood and affect normal.        Visit Vitals  BP (!) 144/80   Pulse 72   Temp 98.2 °F (36.8 °C) (Temporal)   Resp 18   Ht 5' 10\" (1.778 m)   Wt 199 lb (90.3 kg)   SpO2 99%   BMI 28.55 kg/m²       Stefan Abdul MD

## 2023-03-14 NOTE — ASSESSMENT & PLAN NOTE
Well controlled at home, 120's/80's, no changes to medications, he is monitored by specialist. No acute findings meriting change in the plan

## 2023-04-27 ENCOUNTER — TELEPHONE (OUTPATIENT)
Dept: INTERNAL MEDICINE CLINIC | Age: 68
End: 2023-04-27

## 2023-05-02 RX ORDER — ATORVASTATIN CALCIUM 10 MG/1
TABLET, FILM COATED ORAL
Qty: 90 TABLET | Refills: 1 | Status: SHIPPED | OUTPATIENT
Start: 2023-05-02

## 2023-05-09 ENCOUNTER — OFFICE VISIT (OUTPATIENT)
Age: 68
End: 2023-05-09
Payer: MEDICARE

## 2023-05-09 VITALS
WEIGHT: 198 LBS | HEIGHT: 70 IN | RESPIRATION RATE: 16 BRPM | TEMPERATURE: 97.8 F | OXYGEN SATURATION: 97 % | HEART RATE: 64 BPM | DIASTOLIC BLOOD PRESSURE: 80 MMHG | BODY MASS INDEX: 28.35 KG/M2 | SYSTOLIC BLOOD PRESSURE: 110 MMHG

## 2023-05-09 DIAGNOSIS — Z01.818 PREOP EXAM FOR INTERNAL MEDICINE: Primary | ICD-10-CM

## 2023-05-09 PROCEDURE — 1036F TOBACCO NON-USER: CPT | Performed by: INTERNAL MEDICINE

## 2023-05-09 PROCEDURE — 99214 OFFICE O/P EST MOD 30 MIN: CPT | Performed by: INTERNAL MEDICINE

## 2023-05-09 PROCEDURE — 3079F DIAST BP 80-89 MM HG: CPT | Performed by: INTERNAL MEDICINE

## 2023-05-09 PROCEDURE — 93010 ELECTROCARDIOGRAM REPORT: CPT | Performed by: INTERNAL MEDICINE

## 2023-05-09 PROCEDURE — 3017F COLORECTAL CA SCREEN DOC REV: CPT | Performed by: INTERNAL MEDICINE

## 2023-05-09 PROCEDURE — G8428 CUR MEDS NOT DOCUMENT: HCPCS | Performed by: INTERNAL MEDICINE

## 2023-05-09 PROCEDURE — 1123F ACP DISCUSS/DSCN MKR DOCD: CPT | Performed by: INTERNAL MEDICINE

## 2023-05-09 PROCEDURE — G8419 CALC BMI OUT NRM PARAM NOF/U: HCPCS | Performed by: INTERNAL MEDICINE

## 2023-05-09 PROCEDURE — 3074F SYST BP LT 130 MM HG: CPT | Performed by: INTERNAL MEDICINE

## 2023-05-09 PROCEDURE — 93005 ELECTROCARDIOGRAM TRACING: CPT | Performed by: INTERNAL MEDICINE

## 2023-05-09 RX ORDER — VALSARTAN 320 MG/1
TABLET ORAL
Qty: 90 TABLET | Refills: 1 | Status: SHIPPED | OUTPATIENT
Start: 2023-05-09

## 2023-05-09 SDOH — ECONOMIC STABILITY: FOOD INSECURITY: WITHIN THE PAST 12 MONTHS, YOU WORRIED THAT YOUR FOOD WOULD RUN OUT BEFORE YOU GOT MONEY TO BUY MORE.: NEVER TRUE

## 2023-05-09 SDOH — ECONOMIC STABILITY: HOUSING INSECURITY
IN THE LAST 12 MONTHS, WAS THERE A TIME WHEN YOU DID NOT HAVE A STEADY PLACE TO SLEEP OR SLEPT IN A SHELTER (INCLUDING NOW)?: NO

## 2023-05-09 SDOH — ECONOMIC STABILITY: FOOD INSECURITY: WITHIN THE PAST 12 MONTHS, THE FOOD YOU BOUGHT JUST DIDN'T LAST AND YOU DIDN'T HAVE MONEY TO GET MORE.: NEVER TRUE

## 2023-05-09 SDOH — ECONOMIC STABILITY: INCOME INSECURITY: HOW HARD IS IT FOR YOU TO PAY FOR THE VERY BASICS LIKE FOOD, HOUSING, MEDICAL CARE, AND HEATING?: NOT HARD AT ALL

## 2023-05-09 ASSESSMENT — ANXIETY QUESTIONNAIRES
2. NOT BEING ABLE TO STOP OR CONTROL WORRYING: 0
7. FEELING AFRAID AS IF SOMETHING AWFUL MIGHT HAPPEN: 0
1. FEELING NERVOUS, ANXIOUS, OR ON EDGE: 0
GAD7 TOTAL SCORE: 0
4. TROUBLE RELAXING: 0
6. BECOMING EASILY ANNOYED OR IRRITABLE: 0
IF YOU CHECKED OFF ANY PROBLEMS ON THIS QUESTIONNAIRE, HOW DIFFICULT HAVE THESE PROBLEMS MADE IT FOR YOU TO DO YOUR WORK, TAKE CARE OF THINGS AT HOME, OR GET ALONG WITH OTHER PEOPLE: NOT DIFFICULT AT ALL
5. BEING SO RESTLESS THAT IT IS HARD TO SIT STILL: 0
3. WORRYING TOO MUCH ABOUT DIFFERENT THINGS: 0

## 2023-05-09 ASSESSMENT — PATIENT HEALTH QUESTIONNAIRE - PHQ9
SUM OF ALL RESPONSES TO PHQ QUESTIONS 1-9: 0
2. FEELING DOWN, DEPRESSED OR HOPELESS: 0
SUM OF ALL RESPONSES TO PHQ QUESTIONS 1-9: 0
SUM OF ALL RESPONSES TO PHQ9 QUESTIONS 1 & 2: 0
1. LITTLE INTEREST OR PLEASURE IN DOING THINGS: 0

## 2023-05-09 ASSESSMENT — ENCOUNTER SYMPTOMS
GASTROINTESTINAL NEGATIVE: 1
RESPIRATORY NEGATIVE: 1

## 2023-05-09 NOTE — PROGRESS NOTES
Chief Complaint   Patient presents with    Pre-op Exam     Left knee surgery 2023 DR Chantal Vo     eviewed record in preparation for visit and have obtained necessary documentation. Identified pt with two pt identifiers(name and ). Health Maintenance Due   Topic    Diabetes screen          Chief Complaint   Patient presents with    Pre-op Exam     Left knee surgery 2023 DR Chantal Vo        Wt Readings from Last 3 Encounters:   23 198 lb (89.8 kg)   23 199 lb (90.3 kg)   22 200 lb 9.6 oz (91 kg)     Temp Readings from Last 3 Encounters:   23 97.8 °F (36.6 °C) (Temporal)     BP Readings from Last 3 Encounters:   23 110/80   23 (!) 144/80   22 (!) 156/80     Pulse Readings from Last 3 Encounters:   23 64   23 72   22 76           Learning Assessment:  :    No flowsheet data found. Depression Screening:  :    No flowsheet data found. Fall Risk Assessment:  :    No flowsheet data found. Abuse Screening:  :    No flowsheet data found. Coordination of Care Questionnaire:  :    1) Have you been to an emergency room, urgent care clinic since your last visit? no   Hospitalized since your last visit? no             2) Have you seen or consulted any other health care providers outside of [address] [place] [address] since your last visit? yes  (Include any pap smears or colon screenings in this section.)    3) Do you have an Advance Directive on file? no    4) Are you interested in receiving information on Advance Directives?  No

## 2023-05-09 NOTE — TELEPHONE ENCOUNTER
Cardiologist: Dr. Cristofer Peres   Date Time Provider Megan Luna   12/19/2023  1:00 PM MD YRN Mora BS AMB   3/19/2024  7:40 AM MD MALORIE Allen BS AMB       Requested Prescriptions     Signed Prescriptions Disp Refills    valsartan (DIOVAN) 320 MG tablet 90 tablet 1     Sig: TAKE 1 TABLET BY MOUTH EVERY DAY     Authorizing Provider: Deng Jones     Ordering User: ADIA CHAUDHARY         Refills VO per Dr. Toby Mancilla.

## 2023-05-09 NOTE — PROGRESS NOTES
Preoperative Asessment    Johanna Cabezas is 75 y.o. male (1955) who presents for preoperative evaluation. Procedure/Surgery:  Left knee arthroscopy surgery (meniscus repair)  Date of Procedure/Surgery: 5/26/23    Surgeon: Dr. Ruddy Ojeda:   Primary Physician: Ryan Naidu. MD      The patient reports feeling well today. He has not complaints of chest pain or shortness of breath. He has been compliant with all medications. We discussed that he has been exercising which is how he injured his knee. He does not have difficulty with chest discomfort when exercising. He follows up with cardiology for hypertension. Sees Dr. Roseline Taylor    No other complaints. Patient Active Problem List   Diagnosis    Pure hypercholesterolemia    Mixed conductive and sensorineural hearing loss of both ears    Vitamin D deficiency    White coat syndrome with diagnosis of hypertension       Prior to Admission medications    Medication Sig Start Date End Date Taking? Authorizing Provider   amLODIPine (NORVASC) 10 MG tablet Take 1 tablet by mouth daily 3/1/23  Yes Ar Automatic Reconciliation   atorvastatin (LIPITOR) 10 MG tablet Take 1 tablet by mouth daily 9/14/22  Yes Ar Automatic Reconciliation   SODIUM FLUORIDE, DENTAL GEL, 1.1 % CREA USE EVERY DAY IN PLACE OF REGULAR TOOTHPASTE 12/14/20  Yes Ar Automatic Reconciliation   valsartan (DIOVAN) 320 MG tablet Take 1 tablet by mouth daily 1/13/23  Yes Ar Automatic Reconciliation       Review of Systems   Constitutional: Negative. HENT: Negative. Respiratory: Negative. Cardiovascular: Negative. Gastrointestinal: Negative. All other systems reviewed and are negative.         Latex Allergy: None  Recent use of: No recent use of aspirin (ASA), NSAIDS or steroids  Tetanus up to date: last tetanus booster within 10 years  Anesthesia Complications: None  History of abnormal bleeding : Yes: Family Hx   History of Blood Transfusions:

## 2023-08-28 RX ORDER — AMLODIPINE BESYLATE 10 MG/1
TABLET ORAL
Qty: 90 TABLET | Refills: 1 | Status: SHIPPED | OUTPATIENT
Start: 2023-08-28

## 2023-08-28 NOTE — TELEPHONE ENCOUNTER
Requested Prescriptions     Signed Prescriptions Disp Refills    amLODIPine (NORVASC) 10 MG tablet 90 tablet 1     Sig: TAKE 1 TABLET BY MOUTH EVERY DAY     Authorizing Provider: Maryan Bergeron     Ordering User: Dora Allen     Verbal order per Dr. Guerrero Cartagena.     Future Appointments   Date Time Provider [address]   12/19/2023  1:00 PM MD YRN Tong AMB   3/19/2024  7:40 AM MD MALORIE Jones BS AMB

## 2023-11-01 RX ORDER — ATORVASTATIN CALCIUM 10 MG/1
10 TABLET, FILM COATED ORAL DAILY
Qty: 90 TABLET | Refills: 3 | Status: SHIPPED | OUTPATIENT
Start: 2023-11-01

## 2023-11-01 NOTE — TELEPHONE ENCOUNTER
Cardiologist: Dr. James Zhao   Date Time Provider [address]   12/19/2023  1:00 PM MD YRN Olivo BS AMB   3/19/2024  7:40 AM MD MALORIE Wang BS AMB       Requested Prescriptions     Signed Prescriptions Disp Refills    atorvastatin (LIPITOR) 10 MG tablet 90 tablet 3     Sig: TAKE 1 TABLET BY MOUTH EVERY DAY     Authorizing Provider: Jasbir Jordan     Ordering User: ADIA CHAUDHARY         Refills VO per Dr. Julianne Bar.

## 2024-02-16 RX ORDER — AMLODIPINE BESYLATE 10 MG/1
TABLET ORAL
Qty: 90 TABLET | Refills: 1 | Status: SHIPPED | OUTPATIENT
Start: 2024-02-16

## 2024-02-16 NOTE — TELEPHONE ENCOUNTER
Requested Prescriptions     Signed Prescriptions Disp Refills    amLODIPine (NORVASC) 10 MG tablet 90 tablet 1     Sig: TAKE 1 TABLET BY MOUTH EVERY DAY     Authorizing Provider: HIREN RAPHAEL     Ordering User: ABNER RESENDEZ     Verbal order per Dr. Raphael.    Future Appointments   Date Time Provider Department Center   3/19/2024  7:40 AM Ashwin Puente MD [facility] BS AMB   6/12/2024  9:00 AM [facility] BS AMB   6/25/2024  8:20 AM Hiren Raphael MD [facility] BS AMB

## 2024-02-18 DIAGNOSIS — I10 WHITE COAT SYNDROME WITH DIAGNOSIS OF HYPERTENSION: ICD-10-CM

## 2024-02-18 DIAGNOSIS — Z12.5 PROSTATE CANCER SCREENING: ICD-10-CM

## 2024-02-18 DIAGNOSIS — E78.00 PURE HYPERCHOLESTEROLEMIA: Primary | ICD-10-CM

## 2024-02-26 ENCOUNTER — OFFICE VISIT (OUTPATIENT)
Age: 69
End: 2024-02-26
Payer: MEDICARE

## 2024-02-26 VITALS
SYSTOLIC BLOOD PRESSURE: 125 MMHG | HEIGHT: 70 IN | TEMPERATURE: 98 F | BODY MASS INDEX: 27.69 KG/M2 | RESPIRATION RATE: 20 BRPM | DIASTOLIC BLOOD PRESSURE: 76 MMHG | OXYGEN SATURATION: 98 % | WEIGHT: 193.4 LBS | HEART RATE: 56 BPM

## 2024-02-26 DIAGNOSIS — J11.1 INFLUENZA-LIKE ILLNESS: Primary | ICD-10-CM

## 2024-02-26 PROCEDURE — 3017F COLORECTAL CA SCREEN DOC REV: CPT | Performed by: STUDENT IN AN ORGANIZED HEALTH CARE EDUCATION/TRAINING PROGRAM

## 2024-02-26 PROCEDURE — 3078F DIAST BP <80 MM HG: CPT | Performed by: STUDENT IN AN ORGANIZED HEALTH CARE EDUCATION/TRAINING PROGRAM

## 2024-02-26 PROCEDURE — 1036F TOBACCO NON-USER: CPT | Performed by: STUDENT IN AN ORGANIZED HEALTH CARE EDUCATION/TRAINING PROGRAM

## 2024-02-26 PROCEDURE — G8419 CALC BMI OUT NRM PARAM NOF/U: HCPCS | Performed by: STUDENT IN AN ORGANIZED HEALTH CARE EDUCATION/TRAINING PROGRAM

## 2024-02-26 PROCEDURE — 3074F SYST BP LT 130 MM HG: CPT | Performed by: STUDENT IN AN ORGANIZED HEALTH CARE EDUCATION/TRAINING PROGRAM

## 2024-02-26 PROCEDURE — 99213 OFFICE O/P EST LOW 20 MIN: CPT | Performed by: STUDENT IN AN ORGANIZED HEALTH CARE EDUCATION/TRAINING PROGRAM

## 2024-02-26 PROCEDURE — 1123F ACP DISCUSS/DSCN MKR DOCD: CPT | Performed by: STUDENT IN AN ORGANIZED HEALTH CARE EDUCATION/TRAINING PROGRAM

## 2024-02-26 PROCEDURE — G8484 FLU IMMUNIZE NO ADMIN: HCPCS | Performed by: STUDENT IN AN ORGANIZED HEALTH CARE EDUCATION/TRAINING PROGRAM

## 2024-02-26 PROCEDURE — G8427 DOCREV CUR MEDS BY ELIG CLIN: HCPCS | Performed by: STUDENT IN AN ORGANIZED HEALTH CARE EDUCATION/TRAINING PROGRAM

## 2024-02-26 ASSESSMENT — PATIENT HEALTH QUESTIONNAIRE - PHQ9
1. LITTLE INTEREST OR PLEASURE IN DOING THINGS: 0
SUM OF ALL RESPONSES TO PHQ9 QUESTIONS 1 & 2: 0
SUM OF ALL RESPONSES TO PHQ QUESTIONS 1-9: 0
2. FEELING DOWN, DEPRESSED OR HOPELESS: 0
SUM OF ALL RESPONSES TO PHQ QUESTIONS 1-9: 0

## 2024-02-26 NOTE — PATIENT INSTRUCTIONS
Cough suppressants:  - plain delsym (active ingredient dextromethorphan)  - cough drops (active ingredients menthol or lidocaine)    Should continue to get better. If suddenly getting worse then call back.

## 2024-02-26 NOTE — PROGRESS NOTES
Theodore Guevara is a 68 y.o. male who was seen in clinic today (2/26/2024) for an acute visit.     Assessment & Plan:   Below is the assessment and plan developed based on review of pertinent history, physical exam, labs, studies, and medications.    1. Influenza-like illness  - improving   - discussed OTC agents for cough suppression  - wear mask until feeling better     Subjective:   Theodore was seen today for Cough (Tested negative covid 2/20/24.) and Congestion    Has been feeling bad for a week. Started Monday 2/19/24. He had fever, cough, body aches, headache. Laid in bed for 5 days. Wife gave him some OTC things - advil, pedialyte, nyquil. Lost 5lb.     Tested negative for COVID 2/20/24  Did not get tested for Flu    He is feeling much better now - still a little more tired but appetite is back.   Cough is improving but occasionally has a cough and sometimes productive.   No wheezing or shortness of breath. Plays [employer] and has been able to practice   Son wanted him to come get checked out    Patient Active Problem List   Diagnosis    Pure hypercholesterolemia    Mixed conductive and sensorineural hearing loss of both ears    Vitamin D deficiency    White coat syndrome with diagnosis of hypertension       Prior to Admission medications    Medication Sig Start Date End Date Taking? Authorizing Provider   amLODIPine (NORVASC) 10 MG tablet TAKE 1 TABLET BY MOUTH EVERY DAY 2/16/24  Yes Hiren Raphael MD   Cholecalciferol 25 MCG (1000 UT) CHEW Take by mouth   Yes Provider, MD Theresa   valsartan (DIOVAN) 320 MG tablet Take 1 tablet by mouth daily 12/19/23  Yes Hiren Raphael MD   atorvastatin (LIPITOR) 10 MG tablet TAKE 1 TABLET BY MOUTH EVERY DAY 11/1/23  Yes Hiren Raphael MD   SODIUM FLUORIDE, DENTAL GEL, 1.1 % CREA USE EVERY DAY IN PLACE OF REGULAR TOOTHPASTE 12/14/20  Yes Automatic Reconciliation, Ar       Objective:   /76   Pulse 56   Temp 98 °F (36.7 °C) (Temporal)   Resp 20   Ht

## 2024-03-07 DIAGNOSIS — I10 WHITE COAT SYNDROME WITH DIAGNOSIS OF HYPERTENSION: ICD-10-CM

## 2024-03-07 DIAGNOSIS — Z12.5 PROSTATE CANCER SCREENING: ICD-10-CM

## 2024-03-07 DIAGNOSIS — E78.00 PURE HYPERCHOLESTEROLEMIA: ICD-10-CM

## 2024-03-07 LAB
ALBUMIN SERPL-MCNC: 4 G/DL (ref 3.5–5)
ALBUMIN/GLOB SERPL: 1.3 (ref 1.1–2.2)
ALP SERPL-CCNC: 64 U/L (ref 45–117)
ALT SERPL-CCNC: 38 U/L (ref 12–78)
ANION GAP SERPL CALC-SCNC: 5 MMOL/L (ref 5–15)
AST SERPL-CCNC: 25 U/L (ref 15–37)
BILIRUB SERPL-MCNC: 0.8 MG/DL (ref 0.2–1)
BUN SERPL-MCNC: 24 MG/DL (ref 6–20)
BUN/CREAT SERPL: 21 (ref 12–20)
CALCIUM SERPL-MCNC: 9 MG/DL (ref 8.5–10.1)
CHLORIDE SERPL-SCNC: 109 MMOL/L (ref 97–108)
CHOLEST SERPL-MCNC: 142 MG/DL
CO2 SERPL-SCNC: 28 MMOL/L (ref 21–32)
CREAT SERPL-MCNC: 1.17 MG/DL (ref 0.7–1.3)
ERYTHROCYTE [DISTWIDTH] IN BLOOD BY AUTOMATED COUNT: 13.1 % (ref 11.5–14.5)
GLOBULIN SER CALC-MCNC: 3 G/DL (ref 2–4)
GLUCOSE SERPL-MCNC: 100 MG/DL (ref 65–100)
HCT VFR BLD AUTO: 43.3 % (ref 36.6–50.3)
HDLC SERPL-MCNC: 53 MG/DL
HDLC SERPL: 2.7 (ref 0–5)
HGB BLD-MCNC: 14.2 G/DL (ref 12.1–17)
LDLC SERPL CALC-MCNC: 71.6 MG/DL (ref 0–100)
MCH RBC QN AUTO: 28.8 PG (ref 26–34)
MCHC RBC AUTO-ENTMCNC: 32.8 G/DL (ref 30–36.5)
MCV RBC AUTO: 87.8 FL (ref 80–99)
NRBC # BLD: 0 K/UL (ref 0–0.01)
NRBC BLD-RTO: 0 PER 100 WBC
PLATELET # BLD AUTO: 371 K/UL (ref 150–400)
PMV BLD AUTO: 9.6 FL (ref 8.9–12.9)
POTASSIUM SERPL-SCNC: 5.2 MMOL/L (ref 3.5–5.1)
PROT SERPL-MCNC: 7 G/DL (ref 6.4–8.2)
PSA SERPL-MCNC: 0.6 NG/ML (ref 0.01–4)
RBC # BLD AUTO: 4.93 M/UL (ref 4.1–5.7)
SODIUM SERPL-SCNC: 142 MMOL/L (ref 136–145)
TRIGL SERPL-MCNC: 87 MG/DL
VLDLC SERPL CALC-MCNC: 17.4 MG/DL
WBC # BLD AUTO: 6.5 K/UL (ref 4.1–11.1)

## 2024-03-08 NOTE — RESULT ENCOUNTER NOTE
Results released to patient via [facility].  He has follow up on 3/19.  All labs are stable or at goal for him, except for elevated K.  He is on an ARB and runs high/normal.

## 2024-03-17 SDOH — HEALTH STABILITY: PHYSICAL HEALTH: ON AVERAGE, HOW MANY DAYS PER WEEK DO YOU ENGAGE IN MODERATE TO STRENUOUS EXERCISE (LIKE A BRISK WALK)?: 6 DAYS

## 2024-03-17 SDOH — HEALTH STABILITY: PHYSICAL HEALTH: ON AVERAGE, HOW MANY MINUTES DO YOU ENGAGE IN EXERCISE AT THIS LEVEL?: 60 MIN

## 2024-03-17 ASSESSMENT — LIFESTYLE VARIABLES
HOW OFTEN DO YOU HAVE A DRINK CONTAINING ALCOHOL: 4
HOW OFTEN DO YOU HAVE SIX OR MORE DRINKS ON ONE OCCASION: 1
HOW MANY STANDARD DRINKS CONTAINING ALCOHOL DO YOU HAVE ON A TYPICAL DAY: 1 OR 2
HOW OFTEN DO YOU HAVE A DRINK CONTAINING ALCOHOL: 2-3 TIMES A WEEK
HOW MANY STANDARD DRINKS CONTAINING ALCOHOL DO YOU HAVE ON A TYPICAL DAY: 1

## 2024-03-17 ASSESSMENT — PATIENT HEALTH QUESTIONNAIRE - PHQ9
SUM OF ALL RESPONSES TO PHQ9 QUESTIONS 1 & 2: 0
1. LITTLE INTEREST OR PLEASURE IN DOING THINGS: NOT AT ALL
SUM OF ALL RESPONSES TO PHQ QUESTIONS 1-9: 0
SUM OF ALL RESPONSES TO PHQ QUESTIONS 1-9: 0
2. FEELING DOWN, DEPRESSED OR HOPELESS: NOT AT ALL
SUM OF ALL RESPONSES TO PHQ QUESTIONS 1-9: 0
SUM OF ALL RESPONSES TO PHQ QUESTIONS 1-9: 0

## 2024-03-19 ENCOUNTER — OFFICE VISIT (OUTPATIENT)
Age: 69
End: 2024-03-19
Payer: MEDICARE

## 2024-03-19 VITALS
RESPIRATION RATE: 16 BRPM | SYSTOLIC BLOOD PRESSURE: 130 MMHG | WEIGHT: 196.6 LBS | BODY MASS INDEX: 28.15 KG/M2 | TEMPERATURE: 98.2 F | DIASTOLIC BLOOD PRESSURE: 80 MMHG | OXYGEN SATURATION: 98 % | HEIGHT: 70 IN | HEART RATE: 67 BPM

## 2024-03-19 DIAGNOSIS — Z00.00 MEDICARE ANNUAL WELLNESS VISIT, SUBSEQUENT: Primary | ICD-10-CM

## 2024-03-19 DIAGNOSIS — N52.2 DRUG-INDUCED ERECTILE DYSFUNCTION: ICD-10-CM

## 2024-03-19 DIAGNOSIS — E78.00 PURE HYPERCHOLESTEROLEMIA: ICD-10-CM

## 2024-03-19 DIAGNOSIS — Z23 ENCOUNTER FOR IMMUNIZATION: ICD-10-CM

## 2024-03-19 DIAGNOSIS — Z71.89 ADVANCED CARE PLANNING/COUNSELING DISCUSSION: ICD-10-CM

## 2024-03-19 DIAGNOSIS — I10 WHITE COAT SYNDROME WITH DIAGNOSIS OF HYPERTENSION: ICD-10-CM

## 2024-03-19 DIAGNOSIS — I25.10 NON-OCCLUSIVE CORONARY ARTERY DISEASE: ICD-10-CM

## 2024-03-19 PROCEDURE — 3079F DIAST BP 80-89 MM HG: CPT | Performed by: INTERNAL MEDICINE

## 2024-03-19 PROCEDURE — 1123F ACP DISCUSS/DSCN MKR DOCD: CPT | Performed by: INTERNAL MEDICINE

## 2024-03-19 PROCEDURE — 3075F SYST BP GE 130 - 139MM HG: CPT | Performed by: INTERNAL MEDICINE

## 2024-03-19 PROCEDURE — G8484 FLU IMMUNIZE NO ADMIN: HCPCS | Performed by: INTERNAL MEDICINE

## 2024-03-19 PROCEDURE — G0439 PPPS, SUBSEQ VISIT: HCPCS | Performed by: INTERNAL MEDICINE

## 2024-03-19 PROCEDURE — 3017F COLORECTAL CA SCREEN DOC REV: CPT | Performed by: INTERNAL MEDICINE

## 2024-03-19 SDOH — ECONOMIC STABILITY: FOOD INSECURITY: WITHIN THE PAST 12 MONTHS, THE FOOD YOU BOUGHT JUST DIDN'T LAST AND YOU DIDN'T HAVE MONEY TO GET MORE.: NEVER TRUE

## 2024-03-19 SDOH — ECONOMIC STABILITY: FOOD INSECURITY: WITHIN THE PAST 12 MONTHS, YOU WORRIED THAT YOUR FOOD WOULD RUN OUT BEFORE YOU GOT MONEY TO BUY MORE.: NEVER TRUE

## 2024-03-19 SDOH — ECONOMIC STABILITY: INCOME INSECURITY: HOW HARD IS IT FOR YOU TO PAY FOR THE VERY BASICS LIKE FOOD, HOUSING, MEDICAL CARE, AND HEATING?: NOT HARD AT ALL

## 2024-03-19 ASSESSMENT — ENCOUNTER SYMPTOMS
NAUSEA: 0
DIARRHEA: 0
CONSTIPATION: 0
COUGH: 0
ABDOMINAL PAIN: 0
SHORTNESS OF BREATH: 0
BLOOD IN STOOL: 0
VOMITING: 0

## 2024-03-19 NOTE — PROGRESS NOTES
Medicare Annual Wellness Visit    Theodore Guevara is here for Medicare AWV    Assessment & Plan   Medicare annual wellness visit, subsequent  Advanced care planning/counseling discussion  -     FULL CODE  Encounter for immunization  -     pneumococcal 20-valent conjugat (PREVNAR) 0.5 ML CRISTIANO inj; Inject 0.5 mLs into the muscle once for 1 dose, Disp-0.5 mL, R-0Print  Non-occlusive coronary artery disease  Assessment & Plan:  well controlled, asymptomatic.  BP is well controlled, lipids are well controlled.  Continue: current plan.   Monitored by specialist- no acute findings meriting change in the plan  White coat syndrome with diagnosis of hypertension  Assessment & Plan:  Well controlled.  Potassium has increased with dose increase over the last few years, but it is acceptable.  Monitored by specialist- no acute findings meriting change in the plan  Pure hypercholesterolemia  Assessment & Plan:  At goal. Monitored by specialist- no acute findings meriting change in the plan  Drug-induced erectile dysfunction  Assessment & Plan:  New dx, differential reviewed, likely related to Diovan and dose increase.  We reviewed BP is at goal and he has not tolerated multiple medications in the past.  If cardiology wants to keep him on this medication I would recommend trial of Viagra or Cialis.  He will consider and will d/w cardiology.      Recommendations for Preventive Services Due: see orders and patient instructions/AVS.  Recommended screening schedule for the next 5-10 years is provided to the patient in written form: see Patient Instructions/AVS.     Return in 1 year (on 3/19/2025) for FULL PHYSICAL.       Subjective   Since last visit: weight is stable.    See ACP Note for Advanced Care Directive Discussion    Health Maintenance  Immunizations:   COVID: up to date   Influenza: up to date  RSV: up to date   Tetanus: up to date    Shingles: up to date   Pneumonia: script provided    Cancer screening:     Colon:

## 2024-03-19 NOTE — ASSESSMENT & PLAN NOTE
Well controlled.  Potassium has increased with dose increase over the last few years, but it is acceptable.  Monitored by specialist- no acute findings meriting change in the plan

## 2024-03-19 NOTE — PATIENT INSTRUCTIONS
have a serious illness that gets worse over time or can't be cured?  What are you most afraid of that might happen? (Maybe you're afraid of having pain, losing your independence, or being kept alive by machines.)  Where would you prefer to die? (Your home? A hospital? A nursing home?)  Do you want to donate your organs when you die?  Do you want certain Tenriism practices performed before you die?  When should you call for help?  Be sure to contact your doctor if you have any questions.  Where can you learn more?  Go to https://www.[employer].net/patientEd and enter R264 to learn more about \"Advance Directives: Care Instructions.\"  Current as of: November 16, 2023               Content Version: 14.0  © 3877-7967 [employer].   Care instructions adapted under license by [employer]. If you have questions about a medical condition or this instruction, always ask your healthcare professional. [employer] disclaims any warranty or liability for your use of this information.      Personalized Preventive Plan for Theodore Guevara - 3/19/2024  Medicare offers a range of preventive health benefits. Some of the tests and screenings are paid in full while other may be subject to a deductible, co-insurance, and/or copay.    Some of these benefits include a comprehensive review of your medical history including lifestyle, illnesses that may run in your family, and various assessments and screenings as appropriate.    After reviewing your medical record and screening and assessments performed today your provider may have ordered immunizations, labs, imaging, and/or referrals for you.  A list of these orders (if applicable) as well as your Preventive Care list are included within your After Visit Summary for your review.    Other Preventive Recommendations:    A preventive eye exam performed by an eye specialist is recommended every 1-2 years to screen for glaucoma; cataracts, macular degeneration,

## 2024-03-19 NOTE — ACP (ADVANCE CARE PLANNING)
Advance Care Planning   Advance Care Planning (ACP) Physician/NP/PA (Provider) Conversation    Date of ACP Conversation: 03/19/24  Persons included in Conversation:  patient  Length of ACP Conversation in minutes: <16 minutes (Non-Billable)    We discussed the patient’s choices for care and treatment preferences in case of a health event that adversely affects decision-making abilities or is life-limiting. We discusses the differences between FULL CODE and DNR and when to consider a change in code status.  The limitations with CPR were reviewed.    Has NO ACP documents-Information provided.  He elects Full Code (Attempt Resuscitation)    The patient has appointed the following active healthcare agents:    Primary Decision Maker: Yi Moreland - [facility] - [phone]     The Patient has the following current code status:    Code Status: Full Code    Ashwin Puente MD

## 2024-03-19 NOTE — ASSESSMENT & PLAN NOTE
New dx, differential reviewed, likely related to Diovan and dose increase.  We reviewed BP is at goal and he has not tolerated multiple medications in the past.  If cardiology wants to keep him on this medication I would recommend trial of Viagra or Cialis.  He will consider and will d/w cardiology.

## 2024-03-19 NOTE — ASSESSMENT & PLAN NOTE
well controlled, asymptomatic.  BP is well controlled, lipids are well controlled.  Continue: current plan.   Monitored by specialist- no acute findings meriting change in the plan

## 2024-03-24 DIAGNOSIS — N52.2 DRUG-INDUCED ERECTILE DYSFUNCTION: Primary | ICD-10-CM

## 2024-03-24 RX ORDER — SILDENAFIL 50 MG/1
50 TABLET, FILM COATED ORAL PRN
Qty: 10 TABLET | Refills: 3 | Status: SHIPPED | OUTPATIENT
Start: 2024-03-24

## 2024-04-30 ENCOUNTER — ANESTHESIA EVENT (OUTPATIENT)
Facility: HOSPITAL | Age: 69
End: 2024-04-30
Payer: MEDICARE

## 2024-04-30 ENCOUNTER — ANESTHESIA (OUTPATIENT)
Facility: HOSPITAL | Age: 69
End: 2024-04-30
Payer: MEDICARE

## 2024-04-30 ENCOUNTER — HOSPITAL ENCOUNTER (OUTPATIENT)
Facility: HOSPITAL | Age: 69
Setting detail: OUTPATIENT SURGERY
Discharge: HOME | End: 2024-04-30
Attending: STUDENT IN AN ORGANIZED HEALTH CARE EDUCATION/TRAINING PROGRAM | Admitting: STUDENT IN AN ORGANIZED HEALTH CARE EDUCATION/TRAINING PROGRAM
Payer: MEDICARE

## 2024-04-30 VITALS
BODY MASS INDEX: 28.06 KG/M2 | SYSTOLIC BLOOD PRESSURE: 128 MMHG | HEART RATE: 60 BPM | OXYGEN SATURATION: 100 % | RESPIRATION RATE: 16 BRPM | DIASTOLIC BLOOD PRESSURE: 74 MMHG | HEIGHT: 70 IN | TEMPERATURE: 97.2 F | WEIGHT: 196 LBS

## 2024-04-30 PROCEDURE — 7100000011 HC PHASE II RECOVERY - ADDTL 15 MIN: Performed by: STUDENT IN AN ORGANIZED HEALTH CARE EDUCATION/TRAINING PROGRAM

## 2024-04-30 PROCEDURE — 2500000003 HC RX 250 WO HCPCS: Performed by: ANESTHESIOLOGY

## 2024-04-30 PROCEDURE — 6360000002 HC RX W HCPCS: Performed by: ANESTHESIOLOGY

## 2024-04-30 PROCEDURE — 88305 TISSUE EXAM BY PATHOLOGIST: CPT

## 2024-04-30 PROCEDURE — 7100000010 HC PHASE II RECOVERY - FIRST 15 MIN: Performed by: STUDENT IN AN ORGANIZED HEALTH CARE EDUCATION/TRAINING PROGRAM

## 2024-04-30 PROCEDURE — 3600007502: Performed by: STUDENT IN AN ORGANIZED HEALTH CARE EDUCATION/TRAINING PROGRAM

## 2024-04-30 PROCEDURE — 2580000003 HC RX 258: Performed by: STUDENT IN AN ORGANIZED HEALTH CARE EDUCATION/TRAINING PROGRAM

## 2024-04-30 PROCEDURE — 3700000001 HC ADD 15 MINUTES (ANESTHESIA): Performed by: STUDENT IN AN ORGANIZED HEALTH CARE EDUCATION/TRAINING PROGRAM

## 2024-04-30 PROCEDURE — 3700000000 HC ANESTHESIA ATTENDED CARE: Performed by: STUDENT IN AN ORGANIZED HEALTH CARE EDUCATION/TRAINING PROGRAM

## 2024-04-30 PROCEDURE — 2709999900 HC NON-CHARGEABLE SUPPLY: Performed by: STUDENT IN AN ORGANIZED HEALTH CARE EDUCATION/TRAINING PROGRAM

## 2024-04-30 PROCEDURE — 3600007512: Performed by: STUDENT IN AN ORGANIZED HEALTH CARE EDUCATION/TRAINING PROGRAM

## 2024-04-30 RX ORDER — LIDOCAINE HYDROCHLORIDE 20 MG/ML
INJECTION, SOLUTION EPIDURAL; INFILTRATION; INTRACAUDAL; PERINEURAL PRN
Status: DISCONTINUED | OUTPATIENT
Start: 2024-04-30 | End: 2024-04-30 | Stop reason: SDUPTHER

## 2024-04-30 RX ORDER — SODIUM CHLORIDE 9 MG/ML
25 INJECTION, SOLUTION INTRAVENOUS PRN
Status: DISCONTINUED | OUTPATIENT
Start: 2024-04-30 | End: 2024-04-30 | Stop reason: HOSPADM

## 2024-04-30 RX ORDER — SODIUM CHLORIDE 0.9 % (FLUSH) 0.9 %
5-40 SYRINGE (ML) INJECTION PRN
Status: DISCONTINUED | OUTPATIENT
Start: 2024-04-30 | End: 2024-04-30 | Stop reason: HOSPADM

## 2024-04-30 RX ORDER — SODIUM CHLORIDE 0.9 % (FLUSH) 0.9 %
5-40 SYRINGE (ML) INJECTION EVERY 12 HOURS SCHEDULED
Status: DISCONTINUED | OUTPATIENT
Start: 2024-04-30 | End: 2024-04-30 | Stop reason: HOSPADM

## 2024-04-30 RX ADMIN — SODIUM CHLORIDE 25 ML: 9 INJECTION, SOLUTION INTRAVENOUS at 12:24

## 2024-04-30 RX ADMIN — LIDOCAINE HYDROCHLORIDE 40 MG: 20 INJECTION, SOLUTION EPIDURAL; INFILTRATION; INTRACAUDAL; PERINEURAL at 13:14

## 2024-04-30 RX ADMIN — PROPOFOL 220 MG: 10 INJECTION, EMULSION INTRAVENOUS at 13:31

## 2024-04-30 ASSESSMENT — PAIN - FUNCTIONAL ASSESSMENT: PAIN_FUNCTIONAL_ASSESSMENT: 0-10

## 2024-04-30 NOTE — OP NOTE
[facility]                  Colonoscopy Operative Report    4/30/2024      Theodore Guevara  [id number]  1955    Procedure Type:   Colonoscopy with biopsy, polypectomy (cold snare)     Indications:    Screening colonoscopy     Pre-operative Diagnosis: see indication above    Post-operative Diagnosis:  See findings below    :  Key Jeronimo MD    Referring Provider: Ashwin Puente MD      Sedation:  MAC anesthesia    Pre-Procedural Exam:      Airway: clear,  No airway problems anticipated  Heart: RRR, without gallops or rubs  Lungs: clear bilaterally without wheezes, crackles, or rhonchi  Abdomen: soft, nontender, nondistended, bowel sounds present  Mental Status: awake, alert and oriented to person, place and time     Procedure Details:  After informed consent was obtained with all risks and benefits of procedure explained and preoperative exam completed, the patient was taken to the endoscopy suite and placed in the left lateral decubitus position.  Upon sequential sedation as per above, a digital rectal exam was performed .  The Olympus videocolonoscope  was inserted in the rectum and carefully advanced to the terminal ileum.  The cecum was identified by the ileocecal valve and appendiceal orifice.  The quality of preparation was excellent.  BBPS =9. The colonoscope was slowly withdrawn with careful evaluation between folds. Retroflexion in the rectum was completed demonstrating internal hemorrhoids.     Findings:   Rectum: a 6mm sessile polyp was removed with cold snare polypectomy.  Another diminutive 1mm polyp was resected with biopsy forceps  Sigmoid: normal  Descending Colon: normal  Transverse Colon: diminutive 2mm sessile polyp removed with biopsy forceps  Ascending Colon: normal  Cecum: normal  Terminal Ileum: normal      Specimen Removed:   Jar 1) transverse colon polyps  Jar 2) rectal polyp    Complications: None.     EBL:

## 2024-04-30 NOTE — ANESTHESIA PRE PROCEDURE
Department of Anesthesiology  Preprocedure Note       Name:  Theodore Guevara   Age:  68 y.o.  :  1955                                          MRN:  [id number]         Date:  2024      Surgeon: Surgeon(s):  Key Jeronimo MD    Procedure: Procedure(s):  COLONOSCOPY DIAGNOSTIC    Medications prior to admission:   Prior to Admission medications    Medication Sig Start Date End Date Taking? Authorizing Provider   sildenafil (VIAGRA) 50 MG tablet Take 1 tablet by mouth as needed for Erectile Dysfunction 3/24/24   Ashwin Puente MD   amLODIPine (NORVASC) 10 MG tablet TAKE 1 TABLET BY MOUTH EVERY DAY 24   Hiren Raphael MD   Cholecalciferol 25 MCG (1000 UT) CHEW Take by mouth    Provider, MD Theresa   valsartan (DIOVAN) 320 MG tablet Take 1 tablet by mouth daily 23   Hiren Raphael MD   atorvastatin (LIPITOR) 10 MG tablet TAKE 1 TABLET BY MOUTH EVERY DAY 23   Hiren Raphael MD   SODIUM FLUORIDE, DENTAL GEL, 1.1 % CREA USE EVERY DAY IN PLACE OF REGULAR TOOTHPASTE 20   Automatic Reconciliation, Ar       Current medications:    No current facility-administered medications for this encounter.     Current Outpatient Medications   Medication Sig Dispense Refill   • sildenafil (VIAGRA) 50 MG tablet Take 1 tablet by mouth as needed for Erectile Dysfunction 10 tablet 3   • amLODIPine (NORVASC) 10 MG tablet TAKE 1 TABLET BY MOUTH EVERY DAY 90 tablet 1   • Cholecalciferol 25 MCG (1000 UT) CHEW Take by mouth     • valsartan (DIOVAN) 320 MG tablet Take 1 tablet by mouth daily 90 tablet 1   • atorvastatin (LIPITOR) 10 MG tablet TAKE 1 TABLET BY MOUTH EVERY DAY 90 tablet 3   • SODIUM FLUORIDE, DENTAL GEL, 1.1 % CREA USE EVERY DAY IN PLACE OF REGULAR TOOTHPASTE         Allergies:  No Known Allergies    Problem List:    Patient Active Problem List   Diagnosis Code   • Pure hypercholesterolemia E78.00   • 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

## 2024-04-30 NOTE — DISCHARGE INSTRUCTIONS
Theodore Guevara  [id number]  1955    COLONOSCOPY DISCHARGE INSTRUCTIONS  Discomfort:  Redness at IV site- apply warm compress to area; if redness or soreness persist- contact your physician  There may be a slight amount of blood passed from the rectum  Gaseous discomfort- walking, belching will help relieve any discomfort  You may not operate a vehicle for 12 hours  You may not engage in an occupation involving machinery or appliances for rest of today  You may not drink alcoholic beverages for at least 12 hours  Avoid making any critical decisions for at least 24 hour  DIET:   Regular diet.   - however -  remember your colon is empty and a heavy meal will produce gas.   Avoid these foods:  vegetables, fried / greasy foods, carbonated drinks for today    BLOOD-THINNERS:     MEDICATION:  (See attached)     ACTIVITY:  You may not resume your normal daily activities until tomorrow AM; it is recommended that you spend the remainder of the day resting -  avoid any strenuous activity.    CALL M.D. WITH ANY SIGN OF:   Increasing pain, nausea, vomiting  Abdominal distension (swelling)  New increased bleeding (oral or rectal)  Fever (chills)  Pain in chest area  Bloody discharge from nose or mouth  Shortness of breath    You may not  take any Advil, Aspirin, Ibuprofen, Motrin, Aleve, or Goody’s for 10 days, ONLY  Tylenol as needed for pain.    IMPRESSION:    Impression:    2 small rectal and 1 small transverse colon polyp removed  Internal hemorrhoids  Otherwise normal colonoscopy    Recommendations: --Await pathology  -If adenoma is present, repeat colonoscopy in 5 years  -Regular diet.  -Follow up with primary care physician.     -Resume normal medication(s).     Follow-up Instructions:  Results of any biopsies (if taken) will typically be available in about 1 week.  We will attempt to notify you of any biopsy results.  Please call Dr. Jeronimo for results in 7-10 days if we have not notified you sooner.  Please

## 2024-04-30 NOTE — PROGRESS NOTES
Endoscopy Case End Note:    1333:  Procedure scope was pre-cleaned, per protocol, at bedside by CHASE Eisenberg      1333:  Report received from anesthesia - Dr. Abad.  See anesthesia flowsheet for intra-procedure vital signs and events.    Abd soft, non-distended    Pt then taken to recovery area and SBAR report to receiving nurse

## 2024-04-30 NOTE — ANESTHESIA POSTPROCEDURE EVALUATION
Department of Anesthesiology  Postprocedure Note    Patient: Theodore Guevara  MRN: [id number]  YOB: 1955  Date of evaluation: 4/30/2024    Procedure Summary       Date: 04/30/24 Room / Location: [facility] ENDO 03 / [facility] ENDOSCOPY    Anesthesia Start: 1308 Anesthesia Stop: 1333    Procedure: COLONOSCOPY DIAGNOSTIC Diagnosis:       Screen for colon cancer      (Screen for colon cancer [Z12.11])    Surgeons: Key Jeronimo MD Responsible Provider: Skylar Abad DO    Anesthesia Type: TIVA ASA Status: 2            Anesthesia Type: TIVA    Isaias Phase I: Isaias Score: 10    Isaias Phase II: Isaias Score: 10    Anesthesia Post Evaluation    Patient location during evaluation: PACU  Patient participation: complete - patient participated  Level of consciousness: awake  Airway patency: patent  Nausea & Vomiting: no vomiting  Cardiovascular status: hemodynamically stable  Respiratory status: acceptable  Hydration status: euvolemic    No notable events documented.

## 2024-04-30 NOTE — PROGRESS NOTES
Endoscopy Recovery    Report received from Randy Estrada    GI FOCUSED ASSESSMENT:  Neuro: Awake, alert, oriented x4  Respiratory: even and unlabored   GI: soft and non-distended  EKG Rhythm: BBB    Patient returned to baseline, vital signs stable (see vital sign flowsheet).     Patient offered liquids and tolerated well.     Verbal and written discharge instructions give to patient. All questions answered and verbalized understanding. Personal belongings with patient at time of discharge.

## 2024-06-12 ENCOUNTER — ANCILLARY PROCEDURE (OUTPATIENT)
Age: 69
End: 2024-06-12
Payer: MEDICARE

## 2024-06-12 VITALS
SYSTOLIC BLOOD PRESSURE: 140 MMHG | WEIGHT: 196 LBS | DIASTOLIC BLOOD PRESSURE: 80 MMHG | BODY MASS INDEX: 28.06 KG/M2 | HEIGHT: 70 IN | HEART RATE: 64 BPM

## 2024-06-12 DIAGNOSIS — E78.00 PURE HYPERCHOLESTEROLEMIA: ICD-10-CM

## 2024-06-12 DIAGNOSIS — I10 ESSENTIAL (PRIMARY) HYPERTENSION: ICD-10-CM

## 2024-06-12 LAB
ECHO BSA: 2.1 M2
ECHO EST RA PRESSURE: 3 MMHG
ECHO RIGHT VENTRICULAR SYSTOLIC PRESSURE (RVSP): 22 MMHG
ECHO TV REGURGITANT MAX VELOCITY: 2.2 M/S
ECHO TV REGURGITANT PEAK GRADIENT: 19 MMHG
STRESS ANGINA INDEX: 0
STRESS BASELINE DIAS BP: 80 MMHG
STRESS BASELINE HR: 64 BPM
STRESS BASELINE SYS BP: 140 MMHG
STRESS ESTIMATED WORKLOAD: 13.4 METS
STRESS EXERCISE DUR MIN: 11 MIN
STRESS EXERCISE DUR SEC: 30 SEC
STRESS O2 SAT PEAK: 100 %
STRESS O2 SAT REST: 97 %
STRESS PEAK DIAS BP: 90 MMHG
STRESS PEAK SYS BP: 230 MMHG
STRESS PERCENT HR ACHIEVED: 102 %
STRESS POST PEAK HR: 153 BPM
STRESS RATE PRESSURE PRODUCT: NORMAL BPM*MMHG
STRESS TARGET HR: 150 BPM

## 2024-06-12 PROCEDURE — 93320 DOPPLER ECHO COMPLETE: CPT | Performed by: SPECIALIST

## 2024-06-12 PROCEDURE — 93351 STRESS TTE COMPLETE: CPT | Performed by: SPECIALIST

## 2024-06-12 PROCEDURE — 93325 DOPPLER ECHO COLOR FLOW MAPG: CPT | Performed by: SPECIALIST

## 2024-06-17 RX ORDER — VALSARTAN 320 MG/1
320 TABLET ORAL DAILY
Qty: 90 TABLET | Refills: 1 | Status: SHIPPED | OUTPATIENT
Start: 2024-06-17

## 2024-06-17 NOTE — TELEPHONE ENCOUNTER
Requested Prescriptions     Signed Prescriptions Disp Refills    valsartan (DIOVAN) 320 MG tablet 90 tablet 1     Sig: TAKE 1 TABLET BY MOUTH EVERY DAY     Authorizing Provider: EL TAYLOR     Ordering User: ABNER RESENDEZ     Verbal order per Dr. Taylor.  Future Appointments   Date Time Provider Department Center   7/2/2024  9:00 AM Hiren Raphael MD [facility]   3/17/2025  7:40 AM Ashwin Puente MD [facility] AM[facility]

## 2024-07-02 ENCOUNTER — OFFICE VISIT (OUTPATIENT)
Age: 69
End: 2024-07-02
Payer: MEDICARE

## 2024-07-02 VITALS
HEIGHT: 70 IN | HEART RATE: 73 BPM | OXYGEN SATURATION: 92 % | DIASTOLIC BLOOD PRESSURE: 82 MMHG | WEIGHT: 195 LBS | SYSTOLIC BLOOD PRESSURE: 152 MMHG | BODY MASS INDEX: 27.92 KG/M2

## 2024-07-02 DIAGNOSIS — E78.00 PURE HYPERCHOLESTEROLEMIA: ICD-10-CM

## 2024-07-02 DIAGNOSIS — I10 ESSENTIAL (PRIMARY) HYPERTENSION: Primary | ICD-10-CM

## 2024-07-02 PROCEDURE — 3077F SYST BP >= 140 MM HG: CPT | Performed by: SPECIALIST

## 2024-07-02 PROCEDURE — 3079F DIAST BP 80-89 MM HG: CPT | Performed by: SPECIALIST

## 2024-07-02 PROCEDURE — 99214 OFFICE O/P EST MOD 30 MIN: CPT | Performed by: SPECIALIST

## 2024-07-02 PROCEDURE — 3017F COLORECTAL CA SCREEN DOC REV: CPT | Performed by: SPECIALIST

## 2024-07-02 PROCEDURE — 93010 ELECTROCARDIOGRAM REPORT: CPT | Performed by: SPECIALIST

## 2024-07-02 PROCEDURE — 1036F TOBACCO NON-USER: CPT | Performed by: SPECIALIST

## 2024-07-02 PROCEDURE — 93005 ELECTROCARDIOGRAM TRACING: CPT | Performed by: SPECIALIST

## 2024-07-02 PROCEDURE — G8419 CALC BMI OUT NRM PARAM NOF/U: HCPCS | Performed by: SPECIALIST

## 2024-07-02 PROCEDURE — 1123F ACP DISCUSS/DSCN MKR DOCD: CPT | Performed by: SPECIALIST

## 2024-07-02 PROCEDURE — G8427 DOCREV CUR MEDS BY ELIG CLIN: HCPCS | Performed by: SPECIALIST

## 2024-07-02 ASSESSMENT — PATIENT HEALTH QUESTIONNAIRE - PHQ9
1. LITTLE INTEREST OR PLEASURE IN DOING THINGS: NOT AT ALL
SUM OF ALL RESPONSES TO PHQ QUESTIONS 1-9: 0
2. FEELING DOWN, DEPRESSED OR HOPELESS: NOT AT ALL
SUM OF ALL RESPONSES TO PHQ QUESTIONS 1-9: 0
SUM OF ALL RESPONSES TO PHQ9 QUESTIONS 1 & 2: 0

## 2024-07-02 NOTE — PROGRESS NOTES
[facility]                                     DIVISION OF CARDIOLOGY                                                                             OFFICE NOTE                  MACK MURGUIA M.D. , BERYL            EVELINA VILLARREAL   1955  [id number]    Date/Time:  7/2/20248:58 AM      BP (!) 152/82 (Site: Left Upper Arm, Position: Sitting, Cuff Size: Medium Adult)   Pulse 73   Ht 1.778 m (5' 10\")   Wt 88.5 kg (195 lb)   SpO2 92%   BMI 27.98 kg/m²        Wt Readings from Last 3 Encounters:   07/02/24 88.5 kg (195 lb)   06/12/24 88.9 kg (196 lb)   04/30/24 88.9 kg (196 lb)          Lab Results   Component Value Date    CHOL 142 03/07/2024    TRIG 87 03/07/2024    HDL 53 03/07/2024    VLDL 17.4 03/07/2024    CHOLHDLRATIO 2.7 03/07/2024              SUBJECTIVE:    He is doing great he denies any chest pain shortness of breath palpitation presyncopal syncopal episode he exercises at [employer] he walks quite a bit and overall he feels really good he tells me.     Assessment/Plan    1. Hypertension: Blood pressure remains somewhat elevated but he tells me that his blood pressure is completely normal at home approximately 120/70 and he does not want to change his medications at this time.     He was unable to tolerate Bystolic on account of the severe side effects.    He is able is able to tolerate Norvasc with no issues.    He is now on valsartan and tolerating that well.    We have discussed the exercise and nutrition. Reduce salt intake.    2. CAD: He does have subclinical CAD based on calcium score of February 2021 with a calcium score 292.    He remains completely asymptomatic at this time.    Continue with aggressive risk factors modifications.     Continue Lipitor.     Stress echo June 2024 with no ischemia or infarction.  Normal blood pressure response to exercise.  No pulmonary hypertension.  Normal ejection fraction.    We have discussed significance of stress test

## 2024-08-12 RX ORDER — AMLODIPINE BESYLATE 10 MG/1
TABLET ORAL
Qty: 90 TABLET | Refills: 2 | Status: SHIPPED | OUTPATIENT
Start: 2024-08-12

## 2024-08-12 NOTE — TELEPHONE ENCOUNTER
Requested Prescriptions     Signed Prescriptions Disp Refills    amLODIPine (NORVASC) 10 MG tablet 90 tablet 2     Sig: TAKE 1 TABLET BY MOUTH EVERY DAY     Authorizing Provider: AMADO HENRY III     Ordering User: ABNER RESENDEZ     Verbal order per Dr. Henry.    Future Appointments   Date Time Provider Department Center   3/17/2025  7:40 [facility]   4/8/2025  9:00 AM Hiren Raphael MD [facility]  AM[facility]

## 2024-09-16 ENCOUNTER — OFFICE VISIT (OUTPATIENT)
Age: 69
End: 2024-09-16
Payer: MEDICARE

## 2024-09-16 VITALS
WEIGHT: 198.2 LBS | OXYGEN SATURATION: 97 % | HEART RATE: 74 BPM | SYSTOLIC BLOOD PRESSURE: 137 MMHG | RESPIRATION RATE: 16 BRPM | DIASTOLIC BLOOD PRESSURE: 82 MMHG | BODY MASS INDEX: 28.37 KG/M2 | HEIGHT: 70 IN | TEMPERATURE: 97.5 F

## 2024-09-16 DIAGNOSIS — L25.9 CONTACT DERMATITIS, UNSPECIFIED CONTACT DERMATITIS TYPE, UNSPECIFIED TRIGGER: Primary | ICD-10-CM

## 2024-09-16 PROCEDURE — G8419 CALC BMI OUT NRM PARAM NOF/U: HCPCS | Performed by: INTERNAL MEDICINE

## 2024-09-16 PROCEDURE — 3017F COLORECTAL CA SCREEN DOC REV: CPT | Performed by: INTERNAL MEDICINE

## 2024-09-16 PROCEDURE — 99213 OFFICE O/P EST LOW 20 MIN: CPT | Performed by: INTERNAL MEDICINE

## 2024-09-16 PROCEDURE — 3078F DIAST BP <80 MM HG: CPT | Performed by: INTERNAL MEDICINE

## 2024-09-16 PROCEDURE — 1123F ACP DISCUSS/DSCN MKR DOCD: CPT | Performed by: INTERNAL MEDICINE

## 2024-09-16 PROCEDURE — 1036F TOBACCO NON-USER: CPT | Performed by: INTERNAL MEDICINE

## 2024-09-16 PROCEDURE — G8427 DOCREV CUR MEDS BY ELIG CLIN: HCPCS | Performed by: INTERNAL MEDICINE

## 2024-09-16 PROCEDURE — 3075F SYST BP GE 130 - 139MM HG: CPT | Performed by: INTERNAL MEDICINE

## 2024-09-16 RX ORDER — PREDNISONE 10 MG/1
TABLET ORAL
Qty: 30 TABLET | Refills: 0 | Status: SHIPPED | OUTPATIENT
Start: 2024-09-16 | End: 2024-09-27

## 2024-09-16 ASSESSMENT — PATIENT HEALTH QUESTIONNAIRE - PHQ9
2. FEELING DOWN, DEPRESSED OR HOPELESS: NOT AT ALL
SUM OF ALL RESPONSES TO PHQ QUESTIONS 1-9: 0
SUM OF ALL RESPONSES TO PHQ QUESTIONS 1-9: 0
SUM OF ALL RESPONSES TO PHQ9 QUESTIONS 1 & 2: 0
SUM OF ALL RESPONSES TO PHQ QUESTIONS 1-9: 0
SUM OF ALL RESPONSES TO PHQ QUESTIONS 1-9: 0
1. LITTLE INTEREST OR PLEASURE IN DOING THINGS: NOT AT ALL

## 2024-09-27 ENCOUNTER — OFFICE VISIT (OUTPATIENT)
Age: 69
End: 2024-09-27
Payer: MEDICARE

## 2024-09-27 VITALS
TEMPERATURE: 97.5 F | RESPIRATION RATE: 16 BRPM | WEIGHT: 196.6 LBS | BODY MASS INDEX: 28.15 KG/M2 | SYSTOLIC BLOOD PRESSURE: 121 MMHG | DIASTOLIC BLOOD PRESSURE: 80 MMHG | HEART RATE: 76 BPM | HEIGHT: 70 IN | OXYGEN SATURATION: 97 %

## 2024-09-27 DIAGNOSIS — I25.10 NON-OCCLUSIVE CORONARY ARTERY DISEASE: ICD-10-CM

## 2024-09-27 DIAGNOSIS — H25.9 AGE-RELATED CATARACT OF BOTH EYES, UNSPECIFIED AGE-RELATED CATARACT TYPE: ICD-10-CM

## 2024-09-27 DIAGNOSIS — Z01.818 PRE-OP EXAMINATION: Primary | ICD-10-CM

## 2024-09-27 PROCEDURE — G8427 DOCREV CUR MEDS BY ELIG CLIN: HCPCS | Performed by: INTERNAL MEDICINE

## 2024-09-27 PROCEDURE — 3079F DIAST BP 80-89 MM HG: CPT | Performed by: INTERNAL MEDICINE

## 2024-09-27 PROCEDURE — 1123F ACP DISCUSS/DSCN MKR DOCD: CPT | Performed by: INTERNAL MEDICINE

## 2024-09-27 PROCEDURE — 3074F SYST BP LT 130 MM HG: CPT | Performed by: INTERNAL MEDICINE

## 2024-09-27 PROCEDURE — 3017F COLORECTAL CA SCREEN DOC REV: CPT | Performed by: INTERNAL MEDICINE

## 2024-09-27 PROCEDURE — 1036F TOBACCO NON-USER: CPT | Performed by: INTERNAL MEDICINE

## 2024-09-27 PROCEDURE — 99213 OFFICE O/P EST LOW 20 MIN: CPT | Performed by: INTERNAL MEDICINE

## 2024-09-27 PROCEDURE — G8419 CALC BMI OUT NRM PARAM NOF/U: HCPCS | Performed by: INTERNAL MEDICINE

## 2024-09-27 ASSESSMENT — ENCOUNTER SYMPTOMS
CONSTIPATION: 0
SHORTNESS OF BREATH: 0
VOMITING: 0
ABDOMINAL PAIN: 0
BLOOD IN STOOL: 0
NAUSEA: 0
DIARRHEA: 0
COUGH: 0

## 2024-09-27 ASSESSMENT — PATIENT HEALTH QUESTIONNAIRE - PHQ9
SUM OF ALL RESPONSES TO PHQ QUESTIONS 1-9: 0
SUM OF ALL RESPONSES TO PHQ9 QUESTIONS 1 & 2: 0
2. FEELING DOWN, DEPRESSED OR HOPELESS: NOT AT ALL
SUM OF ALL RESPONSES TO PHQ QUESTIONS 1-9: 0
1. LITTLE INTEREST OR PLEASURE IN DOING THINGS: NOT AT ALL

## 2024-09-30 ENCOUNTER — TELEPHONE (OUTPATIENT)
Age: 69
End: 2024-09-30

## 2024-09-30 NOTE — TELEPHONE ENCOUNTER
Dr. Puente has Completed pre op form [facility] for pt dom on 10-9-24 & 10-23-24; form has been faxed to [phone]; approved fax confirmation received. Form will be sent to scanning to be uploaded in pt's chart. Dixie Akhtar CMA

## 2024-10-22 RX ORDER — ATORVASTATIN CALCIUM 10 MG/1
10 TABLET, FILM COATED ORAL DAILY
Qty: 90 TABLET | Refills: 3 | Status: SHIPPED | OUTPATIENT
Start: 2024-10-22

## 2024-10-22 NOTE — TELEPHONE ENCOUNTER
Cardiologist: Dr. Mitchell    Future Appointments   Date Time Provider Department Center   3/17/2025  7:40 AM Ashwin Puente MD [facility]   4/8/2025  9:00 AM Hiren Mitchell MD [facility]       Requested Prescriptions     Signed Prescriptions Disp Refills    atorvastatin (LIPITOR) 10 MG tablet 90 tablet 3     Sig: TAKE 1 TABLET BY MOUTH EVERY DAY     Authorizing Provider: HIREN MITCHELL     Ordering User: ADIA CHAUDHARY         Refills VO per Dr. Mitchell.

## 2024-12-11 RX ORDER — VALSARTAN 320 MG/1
320 TABLET ORAL DAILY
Qty: 90 TABLET | Refills: 1 | Status: SHIPPED | OUTPATIENT
Start: 2024-12-11

## 2024-12-11 NOTE — TELEPHONE ENCOUNTER
Requested Prescriptions     Signed Prescriptions Disp Refills    valsartan (DIOVAN) 320 MG tablet 90 tablet 1     Sig: TAKE 1 TABLET BY MOUTH EVERY DAY     Authorizing Provider: HIREN RAPHAEL     Ordering User: HAM TAMEZ per MD    Future Appointments   Date Time Provider Department Center   3/17/2025  7:40 AM Ashwin Puente MD [facility]   4/8/2025  9:00 AM Hiren Raphael MD [facility]  AMB

## 2025-02-03 ENCOUNTER — CLINICAL DOCUMENTATION (OUTPATIENT)
Dept: CASE MANAGEMENT | Age: 70
End: 2025-02-03

## 2025-02-03 NOTE — ACP (ADVANCE CARE PLANNING)
Advance Care Planning    Advance Care Planning Clinical Specialist  Conversation Note    Date of ACP Conversation: 2/3/2025    ACP Clinical Specialist: Maria L Kay LCSW    Referral Date:  2/3/2025    Received by: Self-Referral    Conversation Conducted with: Patient with decision making capacity and Spouse Yi Guevara      Current Designated Decision Maker/s:    Primary Decision Maker: Yi Moreland - Spouse - [phone]    Secondary Decision Maker: Pedro Guevara - Child - [phone]    Supplemental (Other) Decision Maker: KalebDaisy Rodrígueze - Child - [phone]      Care Preferences Communicated    Code Status:  If the patient's heart were to stop beating, in their present state of health, the patient's CPR Preference: Yes, attempt CPR    If their health worsens and it becomes clear that the chance of recovery is unlikely, the patient's CPR Preference: No, allow a natural death (No CPR)     Ventilator:  If the patient, in their present state of health, suddenly became very ill and unable to breathe on their own, the patient desires the use of a ventilator (intubation).    If their health worsens and it becomes clear that the chance of recovery is unlikely, the patient does not desire the use of a ventilator (intubation).    [x] Yes  [] No   Educated Patient / Decision Maker regarding differences between advance directives and portable DNR orders.    Conversation Summary: ACP Specialist called patient and spouse, Yi Guevara, for scheduled Advance Care Planning appointment.  Pt was able to complete ACP discussion and completed the [place] Advance Directive for Health Care document during today's appointment.  Pt presented as alert and oriented throughout this entire conversation and has been consistent with decisions and medical wishes.    Pt wishes to name his spouse, Yi Guevara, as his primary health care agent, his son, Pedro Guevara, as

## 2025-03-04 ENCOUNTER — PATIENT MESSAGE (OUTPATIENT)
Age: 70
End: 2025-03-04

## 2025-03-04 DIAGNOSIS — Z12.5 PROSTATE CANCER SCREENING: ICD-10-CM

## 2025-03-04 DIAGNOSIS — I25.10 NON-OCCLUSIVE CORONARY ARTERY DISEASE: Primary | ICD-10-CM

## 2025-03-05 NOTE — TELEPHONE ENCOUNTER
Future Appointments   Date Time Provider Department Center   3/17/2025  7:40 AM Ashwin Puente MD [facility] DE[facility]   4/8/2025  9:00 AM Hiren Raphael MD [facility]

## 2025-03-06 DIAGNOSIS — I25.10 NON-OCCLUSIVE CORONARY ARTERY DISEASE: ICD-10-CM

## 2025-03-06 DIAGNOSIS — Z12.5 PROSTATE CANCER SCREENING: ICD-10-CM

## 2025-03-06 LAB
ALBUMIN SERPL-MCNC: 3.9 G/DL (ref 3.5–5)
ALBUMIN/GLOB SERPL: 1.1 (ref 1.1–2.2)
ALP SERPL-CCNC: 56 U/L (ref 45–117)
ALT SERPL-CCNC: 30 U/L (ref 12–78)
ANION GAP SERPL CALC-SCNC: 6 MMOL/L (ref 2–12)
AST SERPL-CCNC: 15 U/L (ref 15–37)
BILIRUB SERPL-MCNC: 0.7 MG/DL (ref 0.2–1)
BUN SERPL-MCNC: 24 MG/DL (ref 6–20)
BUN/CREAT SERPL: 23 (ref 12–20)
CALCIUM SERPL-MCNC: 9.1 MG/DL (ref 8.5–10.1)
CHLORIDE SERPL-SCNC: 105 MMOL/L (ref 97–108)
CHOLEST SERPL-MCNC: 131 MG/DL
CO2 SERPL-SCNC: 26 MMOL/L (ref 21–32)
CREAT SERPL-MCNC: 1.06 MG/DL (ref 0.7–1.3)
ERYTHROCYTE [DISTWIDTH] IN BLOOD BY AUTOMATED COUNT: 12.5 % (ref 11.5–14.5)
GLOBULIN SER CALC-MCNC: 3.4 G/DL (ref 2–4)
GLUCOSE SERPL-MCNC: 98 MG/DL (ref 65–100)
HCT VFR BLD AUTO: 43.1 % (ref 36.6–50.3)
HDLC SERPL-MCNC: 50 MG/DL
HDLC SERPL: 2.6 (ref 0–5)
HGB BLD-MCNC: 14.5 G/DL (ref 12.1–17)
LDLC SERPL CALC-MCNC: 66 MG/DL (ref 0–100)
MCH RBC QN AUTO: 28.9 PG (ref 26–34)
MCHC RBC AUTO-ENTMCNC: 33.6 G/DL (ref 30–36.5)
MCV RBC AUTO: 86 FL (ref 80–99)
NRBC # BLD: 0 K/UL (ref 0–0.01)
NRBC BLD-RTO: 0 PER 100 WBC
PLATELET # BLD AUTO: 315 K/UL (ref 150–400)
PMV BLD AUTO: 10 FL (ref 8.9–12.9)
POTASSIUM SERPL-SCNC: 4.8 MMOL/L (ref 3.5–5.1)
PROT SERPL-MCNC: 7.3 G/DL (ref 6.4–8.2)
PSA SERPL-MCNC: 0.6 NG/ML (ref 0.01–4)
RBC # BLD AUTO: 5.01 M/UL (ref 4.1–5.7)
SODIUM SERPL-SCNC: 137 MMOL/L (ref 136–145)
TRIGL SERPL-MCNC: 75 MG/DL
VLDLC SERPL CALC-MCNC: 15 MG/DL
WBC # BLD AUTO: 6.2 K/UL (ref 4.1–11.1)

## 2025-03-07 NOTE — RESULT ENCOUNTER NOTE
Results released to patient via [employer].  All labs are stable or at goal for him. He has appointment on 3/17 to review.

## 2025-03-13 SDOH — HEALTH STABILITY: PHYSICAL HEALTH: ON AVERAGE, HOW MANY MINUTES DO YOU ENGAGE IN EXERCISE AT THIS LEVEL?: 60 MIN

## 2025-03-13 SDOH — HEALTH STABILITY: PHYSICAL HEALTH: ON AVERAGE, HOW MANY DAYS PER WEEK DO YOU ENGAGE IN MODERATE TO STRENUOUS EXERCISE (LIKE A BRISK WALK)?: 5 DAYS

## 2025-03-13 ASSESSMENT — PATIENT HEALTH QUESTIONNAIRE - PHQ9
SUM OF ALL RESPONSES TO PHQ QUESTIONS 1-9: 0
1. LITTLE INTEREST OR PLEASURE IN DOING THINGS: NOT AT ALL
2. FEELING DOWN, DEPRESSED OR HOPELESS: NOT AT ALL
SUM OF ALL RESPONSES TO PHQ QUESTIONS 1-9: 0

## 2025-03-13 ASSESSMENT — LIFESTYLE VARIABLES
HOW OFTEN DO YOU HAVE SIX OR MORE DRINKS ON ONE OCCASION: 1
HOW OFTEN DO YOU HAVE A DRINK CONTAINING ALCOHOL: 2-3 TIMES A WEEK
HOW OFTEN DO YOU HAVE A DRINK CONTAINING ALCOHOL: 4
HOW MANY STANDARD DRINKS CONTAINING ALCOHOL DO YOU HAVE ON A TYPICAL DAY: 1
HOW MANY STANDARD DRINKS CONTAINING ALCOHOL DO YOU HAVE ON A TYPICAL DAY: 1 OR 2

## 2025-03-14 SDOH — ECONOMIC STABILITY: INCOME INSECURITY: IN THE LAST 12 MONTHS, WAS THERE A TIME WHEN YOU WERE NOT ABLE TO PAY THE MORTGAGE OR RENT ON TIME?: NO

## 2025-03-14 SDOH — ECONOMIC STABILITY: FOOD INSECURITY: WITHIN THE PAST 12 MONTHS, THE FOOD YOU BOUGHT JUST DIDN'T LAST AND YOU DIDN'T HAVE MONEY TO GET MORE.: NEVER TRUE

## 2025-03-14 SDOH — ECONOMIC STABILITY: FOOD INSECURITY: WITHIN THE PAST 12 MONTHS, YOU WORRIED THAT YOUR FOOD WOULD RUN OUT BEFORE YOU GOT MONEY TO BUY MORE.: NEVER TRUE

## 2025-03-14 SDOH — ECONOMIC STABILITY: TRANSPORTATION INSECURITY
IN THE PAST 12 MONTHS, HAS LACK OF TRANSPORTATION KEPT YOU FROM MEETINGS, WORK, OR FROM GETTING THINGS NEEDED FOR DAILY LIVING?: NO

## 2025-03-14 SDOH — ECONOMIC STABILITY: TRANSPORTATION INSECURITY
IN THE PAST 12 MONTHS, HAS THE LACK OF TRANSPORTATION KEPT YOU FROM MEDICAL APPOINTMENTS OR FROM GETTING MEDICATIONS?: NO

## 2025-03-17 ENCOUNTER — OFFICE VISIT (OUTPATIENT)
Age: 70
End: 2025-03-17
Payer: MEDICARE

## 2025-03-17 VITALS
DIASTOLIC BLOOD PRESSURE: 79 MMHG | HEART RATE: 71 BPM | HEIGHT: 70 IN | WEIGHT: 198.4 LBS | SYSTOLIC BLOOD PRESSURE: 125 MMHG | TEMPERATURE: 97.5 F | RESPIRATION RATE: 16 BRPM | OXYGEN SATURATION: 97 % | BODY MASS INDEX: 28.4 KG/M2

## 2025-03-17 DIAGNOSIS — I10 WHITE COAT SYNDROME WITH DIAGNOSIS OF HYPERTENSION: ICD-10-CM

## 2025-03-17 DIAGNOSIS — I25.10 NON-OCCLUSIVE CORONARY ARTERY DISEASE: ICD-10-CM

## 2025-03-17 DIAGNOSIS — N52.2 DRUG-INDUCED ERECTILE DYSFUNCTION: ICD-10-CM

## 2025-03-17 DIAGNOSIS — Z71.89 ADVANCED CARE PLANNING/COUNSELING DISCUSSION: ICD-10-CM

## 2025-03-17 DIAGNOSIS — E78.00 PURE HYPERCHOLESTEROLEMIA: ICD-10-CM

## 2025-03-17 DIAGNOSIS — Z00.00 MEDICARE ANNUAL WELLNESS VISIT, SUBSEQUENT: Primary | ICD-10-CM

## 2025-03-17 PROCEDURE — 1123F ACP DISCUSS/DSCN MKR DOCD: CPT | Performed by: INTERNAL MEDICINE

## 2025-03-17 PROCEDURE — 3078F DIAST BP <80 MM HG: CPT | Performed by: INTERNAL MEDICINE

## 2025-03-17 PROCEDURE — 3017F COLORECTAL CA SCREEN DOC REV: CPT | Performed by: INTERNAL MEDICINE

## 2025-03-17 PROCEDURE — 1126F AMNT PAIN NOTED NONE PRSNT: CPT | Performed by: INTERNAL MEDICINE

## 2025-03-17 PROCEDURE — 3074F SYST BP LT 130 MM HG: CPT | Performed by: INTERNAL MEDICINE

## 2025-03-17 PROCEDURE — G0439 PPPS, SUBSEQ VISIT: HCPCS | Performed by: INTERNAL MEDICINE

## 2025-03-17 PROCEDURE — 1159F MED LIST DOCD IN RCRD: CPT | Performed by: INTERNAL MEDICINE

## 2025-03-17 PROCEDURE — 1160F RVW MEDS BY RX/DR IN RCRD: CPT | Performed by: INTERNAL MEDICINE

## 2025-03-17 ASSESSMENT — ENCOUNTER SYMPTOMS
VOMITING: 0
BLOOD IN STOOL: 0
DIARRHEA: 0
CONSTIPATION: 0
NAUSEA: 0
SHORTNESS OF BREATH: 0
ABDOMINAL PAIN: 0
COUGH: 0

## 2025-03-17 NOTE — ACP (ADVANCE CARE PLANNING)
Advance Care Planning   Advance Care Planning (ACP) Physician/NP/PA (Provider) Conversation    Date of ACP Conversation: 03/17/25  Persons included in Conversation:  patient  Length of ACP Conversation in minutes: <16 minutes (Non-Billable)    We discussed the patient’s choices for care and treatment preferences in case of a health event that adversely affects decision-making abilities or is life-limiting. We discusses the differences between FULL CODE and DNR and when to consider a change in code status.  The limitations with CPR were reviewed.    Has ACP document(s) on file - reflects the patient's care preferences.  He elects Full Code (Attempt Resuscitation)    The patient has appointed the following active healthcare agents:    Primary Decision Maker: Yi Moreland - Spouse - [phone]    Secondary Decision Maker: Pedro Guevara - Child - [phone]    Supplemental (Other) Decision Maker: Daisy Manuel - Child - [phone]     Ashwin Puente MD

## 2025-03-17 NOTE — ASSESSMENT & PLAN NOTE
He is asymptomatic and well controlled.  He had an abnormal cardiac calcium score in '21 (total: 292).  BP is well controlled, lipids are well controlled.  No changes to current plan

## 2025-03-17 NOTE — PATIENT INSTRUCTIONS
Personalized Preventive Plan for Theodore Guevara - 3/17/2025  Medicare offers a range of preventive health benefits. Some of the tests and screenings are paid in full while other may be subject to a deductible, co-insurance, and/or copay.  Some of these benefits include a comprehensive review of your medical history including lifestyle, illnesses that may run in your family, and various assessments and screenings as appropriate.  After reviewing your medical record and screening and assessments performed today your provider may have ordered immunizations, labs, imaging, and/or referrals for you.  A list of these orders (if applicable) as well as your Preventive Care list are included within your After Visit Summary for your review.

## 2025-03-17 NOTE — PROGRESS NOTES
Medicare Annual Wellness Visit    Theodore Guevara is here for Medicare AWV    Assessment & Plan   Medicare annual wellness visit, subsequent  Advanced care planning/counseling discussion  Non-occlusive coronary artery disease  Assessment & Plan:  He is asymptomatic and well controlled.  He had an abnormal cardiac calcium score in '21 (total: 292).  BP is well controlled, lipids are well controlled.  No changes to current plan  White coat syndrome with diagnosis of hypertension  Assessment & Plan:   Chronic, at goal (stable), continue current treatment plan  Pure hypercholesterolemia  Assessment & Plan:   Chronic, at goal (stable), continue current treatment plan  Drug-induced erectile dysfunction  Assessment & Plan:   Chronic, at goal (stable), continue current treatment plan     Recommendations for Preventive Services Due: see orders and patient instructions/AVS.  Recommended screening schedule for the next 5-10 years is provided to the patient in written form: see Patient Instructions/AVS.     Return in 1 year (on 3/17/2026) for Medicare AWV.       Subjective   Since last visit: weight is stable.    See ACP Note for Advanced Care Directive Discussion    Health Maintenance  Immunizations:   COVID: up to date  Influenza: up to date  RSV: up to date  Tetanus: up to date    Shingles: up to date   Pneumonia: up to date    Cancer screening:     Colon: reviewed guidelines, up to date.    Prostate: reviewed guidelines, up to date.       AAA Screening: n/a - never smoked       The following acute and/or chronic problems were addressed today:    Cardiovascular Review  The patient has hypertension, hyperlipidemia, and non-occlusive coronary artery disease.  He reports taking medications as instructed, no medication side effects noted, home BP diary reviewed and BP is at goal.  He generally follows a low fat low cholesterol diet, generally follows a low sodium diet.      Urology Review  He is here today because of ED.  He

## 2025-04-08 ENCOUNTER — OFFICE VISIT (OUTPATIENT)
Age: 70
End: 2025-04-08
Payer: MEDICARE

## 2025-04-08 VITALS
OXYGEN SATURATION: 97 % | SYSTOLIC BLOOD PRESSURE: 138 MMHG | WEIGHT: 200 LBS | DIASTOLIC BLOOD PRESSURE: 86 MMHG | HEIGHT: 69 IN | BODY MASS INDEX: 29.62 KG/M2 | HEART RATE: 67 BPM

## 2025-04-08 DIAGNOSIS — I10 ESSENTIAL (PRIMARY) HYPERTENSION: Primary | ICD-10-CM

## 2025-04-08 DIAGNOSIS — I25.10 NON-OCCLUSIVE CORONARY ARTERY DISEASE: ICD-10-CM

## 2025-04-08 DIAGNOSIS — E78.00 PURE HYPERCHOLESTEROLEMIA: ICD-10-CM

## 2025-04-08 PROCEDURE — 93010 ELECTROCARDIOGRAM REPORT: CPT | Performed by: SPECIALIST

## 2025-04-08 PROCEDURE — 1123F ACP DISCUSS/DSCN MKR DOCD: CPT | Performed by: SPECIALIST

## 2025-04-08 PROCEDURE — 99214 OFFICE O/P EST MOD 30 MIN: CPT | Performed by: SPECIALIST

## 2025-04-08 PROCEDURE — 3017F COLORECTAL CA SCREEN DOC REV: CPT | Performed by: SPECIALIST

## 2025-04-08 PROCEDURE — 93005 ELECTROCARDIOGRAM TRACING: CPT | Performed by: SPECIALIST

## 2025-04-08 PROCEDURE — G8419 CALC BMI OUT NRM PARAM NOF/U: HCPCS | Performed by: SPECIALIST

## 2025-04-08 PROCEDURE — 1159F MED LIST DOCD IN RCRD: CPT | Performed by: SPECIALIST

## 2025-04-08 PROCEDURE — 1126F AMNT PAIN NOTED NONE PRSNT: CPT | Performed by: SPECIALIST

## 2025-04-08 PROCEDURE — 1036F TOBACCO NON-USER: CPT | Performed by: SPECIALIST

## 2025-04-08 PROCEDURE — 3075F SYST BP GE 130 - 139MM HG: CPT | Performed by: SPECIALIST

## 2025-04-08 PROCEDURE — G8427 DOCREV CUR MEDS BY ELIG CLIN: HCPCS | Performed by: SPECIALIST

## 2025-04-08 PROCEDURE — 3079F DIAST BP 80-89 MM HG: CPT | Performed by: SPECIALIST

## 2025-04-08 RX ORDER — ATENOLOL 25 MG/1
TABLET ORAL
Qty: 1 TABLET | Refills: 0 | Status: SHIPPED | OUTPATIENT
Start: 2025-09-15

## 2025-04-08 NOTE — PATIENT INSTRUCTIONS
An order has been placed for you for a Coronary CTA . You will be called to schedule this through Central Scheduling. If you do not hear from them in one week call them at [phone].     You will need to take a medication called Atenolol for this procedure to be sure your heart rate stays around 60 beats per minute. You will need to take Atenolol 25mg the morning of your procedure. This has been sent to your pharmacy.    Please call to schedule a CTA 2 weeks prior to your 6 month follow-up

## 2025-04-08 NOTE — PROGRESS NOTES
[facility]                                     DIVISION OF CARDIOLOGY                                                                             OFFICE NOTE                  MACK MURGUIA M.D. , BERYL            EVELINA VILLARREAL   1955  [id number]    Date/Time:  4/8/20258:07 AM      There were no vitals taken for this visit.       Wt Readings from Last 3 Encounters:   03/17/25 90 kg (198 lb 6.4 oz)   09/27/24 89.2 kg (196 lb 9.6 oz)   09/16/24 89.9 kg (198 lb 3.2 oz)          Lab Results   Component Value Date    CHOL 131 03/06/2025    TRIG 75 03/06/2025    HDL 50 03/06/2025    LDL 66 03/06/2025    VLDL 15 03/06/2025    CHOLHDLRATIO 2.6 03/06/2025     Lab Results   Component Value Date    ALT 30 03/06/2025    AST 15 03/06/2025    ALKPHOS 56 03/06/2025    BILITOT 0.7 03/06/2025               SUBJECTIVE:  Is doing very well he feels like his exercise quite a bit he goes to [employer] still and he denies any chest pain or chest discomfort or particular shortness of breath or palpitations.       Assessment/Plan  1. Hypertension: Blood pressure is slightly elevated here today but tells me is very stressed every time he comes.    His blood pressure is completely normal at home approximately 120/70 and he does not want to change his medications at this time.     He was unable to tolerate Bystolic on account of the severe side effects.    He is able is able to tolerate Norvasc with no issues.    He is now on valsartan and tolerating that well.    We have discussed the exercise and nutrition. Reduce salt intake.    2. CAD: He does have subclinical CAD based on calcium score of February 2021 with a calcium score 292.    He remains completely asymptomatic at this time.    He does have a very strong family history for coronary events all of his siblings essentially have had PCI's.    Continue with aggressive risk factors modifications.     Continue Lipitor.     Stress echo June 2024

## 2025-04-28 DIAGNOSIS — N52.2 DRUG-INDUCED ERECTILE DYSFUNCTION: ICD-10-CM

## 2025-04-28 RX ORDER — SILDENAFIL 50 MG/1
50 TABLET, FILM COATED ORAL PRN
Qty: 10 TABLET | Refills: 3 | Status: SHIPPED | OUTPATIENT
Start: 2025-04-28

## 2025-05-09 RX ORDER — AMLODIPINE BESYLATE 10 MG/1
10 TABLET ORAL DAILY
Qty: 90 TABLET | Refills: 2 | OUTPATIENT
Start: 2025-05-09

## 2025-05-09 RX ORDER — AMLODIPINE BESYLATE 10 MG/1
10 TABLET ORAL DAILY
Qty: 90 TABLET | Refills: 1 | Status: SHIPPED | OUTPATIENT
Start: 2025-05-09

## 2025-05-09 NOTE — TELEPHONE ENCOUNTER
Requested Prescriptions     Signed Prescriptions Disp Refills    amLODIPine (NORVASC) 10 MG tablet 90 tablet 1     Sig: Take 1 tablet by mouth daily     Authorizing Provider: HIREN RAPHAEL     Ordering User: ABNER RESENDEZ     Verbal order per Dr. Raphael.     Future Appointments   Date Time Provider Department Center   9/16/2025  9:30 AM [facility] CT 1 [facility]CT [facility]   10/1/2025  1:20 PM Hiren Raphael MD [facility]   3/23/2026  7:40 AM Ashwin Puente MD [facility] DEP

## 2025-05-12 RX ORDER — VALSARTAN 320 MG/1
320 TABLET ORAL DAILY
Qty: 90 TABLET | Refills: 1 | Status: SHIPPED | OUTPATIENT
Start: 2025-05-12

## 2025-05-12 NOTE — TELEPHONE ENCOUNTER
Requested Prescriptions     Signed Prescriptions Disp Refills    valsartan (DIOVAN) 320 MG tablet 90 tablet 1     Sig: TAKE 1 TABLET BY MOUTH EVERY DAY     Authorizing Provider: HIREN RAPHAEL     Ordering User: ABNER RESENDEZ     Verbal order per Dr. Raphael.     Future Appointments   Date Time Provider Department Center   9/16/2025  9:30 AM [facility] CT 1 MRMRCT [facility]   10/1/2025  1:20 PM Hiren Raphael MD [facility]   3/23/2026  7:40 AM Ashwin Puente MD [facility] DEP

## 2025-07-09 ENCOUNTER — TELEPHONE (OUTPATIENT)
Age: 70
End: 2025-07-09

## 2025-07-09 NOTE — TELEPHONE ENCOUNTER
LVM that we rescheduled same time/date to [place] from [place] (on vacation) and for him to call if he'd like to schedule with [place]

## 2025-07-10 ENCOUNTER — TELEPHONE (OUTPATIENT)
Age: 70
End: 2025-07-10

## 2025-07-10 NOTE — TELEPHONE ENCOUNTER
Patient would like a follow up appointment with Dr Raphael sometime after his CT scheduled 9/16. Offered patient next available  at both locations. Patient is considering canceling his CT because he feels the follow up date is to far out.     # [phone]

## 2025-07-10 NOTE — TELEPHONE ENCOUNTER
Identifiers x 2. Patient would like to see Dr. Raphael after CTA for results. Informed patient that per note and AVS, test and follow up were to be scheduled in 6 months.  He feels that Dr. Raphael had wanted it done prior to 6 months. To continue as planeed. Scheduled follow up at . Address provided.  Canceled upcoming appt with NP.     Future Appointments   Date Time Provider Department Center   9/16/2025  9:30 AM [facility] CT 1 [facility]   9/25/2025 10:00 AM Hiren Raphael MD [facility]   3/23/2026  7:40 AM Ashwin Puente MD [facility] DEP

## 2025-08-31 ENCOUNTER — TELEPHONE (OUTPATIENT)
Age: 70
End: 2025-08-31

## (undated) DEVICE — TIP SUCT TRNSPAR RIB SURF STD BLB RIG NVENT W/ 5IN1 CONN DYND50138] MEDLINE INDUSTRIES INC]

## (undated) DEVICE — CONTAINER SPEC 20 ML LID NEUT BUFF FORMALIN 10 % POLYPR STS

## (undated) DEVICE — SNARE ENDOSCP POLYP MED STD AD 2.4X27X240 CM 2.8 MM OVL SENS

## (undated) DEVICE — SET GRAV CK VLV NEEDLESS ST 3 GANGED 4WAY STPCOCK HI FLO 10

## (undated) DEVICE — CUFF BLD PRSS AD CLTH SGL TB W/ BAYNT CONN ROUNDED CORNER

## (undated) DEVICE — ELECTRODE PT RET AD L9FT HI MOIST COND ADH HYDRGEL CORDED

## (undated) DEVICE — FORCEPS BX L240CM JAW DIA2.4MM ORNG L CAP W/ NDL DISP RAD

## (undated) DEVICE — ENDOSCOPIC KIT COMPLIANCE ENDOKIT

## (undated) DEVICE — IV START KIT: Brand: MEDLINE

## (undated) DEVICE — TRAP ENDOSCP POLYP 2 CHMBR DRAWER TYP